# Patient Record
Sex: FEMALE | Race: BLACK OR AFRICAN AMERICAN | NOT HISPANIC OR LATINO | Employment: UNEMPLOYED | URBAN - METROPOLITAN AREA
[De-identification: names, ages, dates, MRNs, and addresses within clinical notes are randomized per-mention and may not be internally consistent; named-entity substitution may affect disease eponyms.]

---

## 2018-07-18 ENCOUNTER — OFFICE VISIT CONVERTED (OUTPATIENT)
Dept: ORTHOPEDIC SURGERY | Facility: CLINIC | Age: 58
End: 2018-07-18
Attending: ORTHOPAEDIC SURGERY

## 2018-12-05 ENCOUNTER — OFFICE VISIT CONVERTED (OUTPATIENT)
Dept: ORTHOPEDIC SURGERY | Facility: CLINIC | Age: 58
End: 2018-12-05
Attending: ORTHOPAEDIC SURGERY

## 2019-01-02 ENCOUNTER — OFFICE VISIT CONVERTED (OUTPATIENT)
Dept: ORTHOPEDIC SURGERY | Facility: CLINIC | Age: 59
End: 2019-01-02
Attending: ORTHOPAEDIC SURGERY

## 2019-02-07 ENCOUNTER — HOSPITAL ENCOUNTER (OUTPATIENT)
Dept: PREADMISSION TESTING | Facility: HOSPITAL | Age: 59
Discharge: HOME | End: 2019-02-07
Attending: ORTHOPAEDIC SURGERY

## 2019-02-07 LAB
ANION GAP SERPL CALC-SCNC: 16 MMOL/L (ref 8–19)
APTT BLD: 22.4 S (ref 22.2–34.2)
BASOPHILS # BLD AUTO: 0.05 10*3/UL (ref 0–0.2)
BASOPHILS NFR BLD AUTO: 1.35 % (ref 0–3)
BUN SERPL-MCNC: 28 MG/DL (ref 5–25)
BUN/CREAT SERPL: 14 {RATIO} (ref 6–20)
CALCIUM SERPL-MCNC: 8.5 MG/DL (ref 8.7–10.4)
CHLORIDE SERPL-SCNC: 106 MMOL/L (ref 99–111)
CONV CO2: 24 MMOL/L (ref 22–32)
CREAT UR-MCNC: 1.95 MG/DL (ref 0.5–0.9)
EOSINOPHIL # BLD AUTO: 0.05 10*3/UL (ref 0–0.7)
EOSINOPHIL # BLD AUTO: 1.35 % (ref 0–7)
ERYTHROCYTE [DISTWIDTH] IN BLOOD BY AUTOMATED COUNT: 13.1 % (ref 11.5–14.5)
GFR SERPLBLD BASED ON 1.73 SQ M-ARVRAT: 31 ML/MIN/{1.73_M2}
GLUCOSE SERPL-MCNC: 164 MG/DL (ref 65–99)
HBA1C MFR BLD: 11.5 G/DL (ref 12–16)
HCT VFR BLD AUTO: 33.5 % (ref 37–47)
INR PPP: 0.96 (ref 2–3)
LYMPHOCYTES # BLD AUTO: 1.38 10*3/UL (ref 1–5)
MCH RBC QN AUTO: 35.1 PG (ref 27–31)
MCHC RBC AUTO-ENTMCNC: 34.2 G/DL (ref 33–37)
MCV RBC AUTO: 102 FL (ref 81–99)
MONOCYTES # BLD AUTO: 0.49 10*3/UL (ref 0.2–1.2)
MONOCYTES NFR BLD AUTO: 13.4 % (ref 3–10)
NEUTROPHILS # BLD AUTO: 1.66 10*3/UL (ref 2–8)
NEUTROPHILS NFR BLD AUTO: 45.9 % (ref 30–85)
NRBC BLD AUTO-RTO: 0 % (ref 0–0.01)
OSMOLALITY SERPL CALC.SUM OF ELEC: 303 MOSM/KG (ref 273–304)
PLATELET # BLD AUTO: 104 10*3/UL (ref 130–400)
PMV BLD AUTO: 9.1 FL (ref 7.4–10.4)
POTASSIUM SERPL-SCNC: 4.1 MMOL/L (ref 3.5–5.3)
PROTHROMBIN TIME: 10 S (ref 9.4–12)
RBC # BLD AUTO: 3.27 10*6/UL (ref 4.2–5.4)
SODIUM SERPL-SCNC: 142 MMOL/L (ref 135–147)
VARIANT LYMPHS NFR BLD MANUAL: 38 % (ref 20–45)
WBC # BLD AUTO: 3.63 10*3/UL (ref 4.8–10.8)

## 2019-03-06 ENCOUNTER — OFFICE VISIT CONVERTED (OUTPATIENT)
Dept: ORTHOPEDIC SURGERY | Facility: CLINIC | Age: 59
End: 2019-03-06
Attending: PHYSICIAN ASSISTANT

## 2019-04-19 ENCOUNTER — OFFICE VISIT CONVERTED (OUTPATIENT)
Dept: ORTHOPEDIC SURGERY | Facility: CLINIC | Age: 59
End: 2019-04-19
Attending: ORTHOPAEDIC SURGERY

## 2019-04-19 ENCOUNTER — CONVERSION ENCOUNTER (OUTPATIENT)
Dept: ORTHOPEDIC SURGERY | Facility: CLINIC | Age: 59
End: 2019-04-19

## 2019-07-23 ENCOUNTER — HOSPITAL ENCOUNTER (OUTPATIENT)
Dept: OTHER | Facility: HOSPITAL | Age: 59
Discharge: HOME | End: 2019-07-23
Attending: PODIATRIST

## 2019-07-23 LAB — URATE SERPL-MCNC: 6.8 MG/DL (ref 2.5–7.5)

## 2019-09-30 ENCOUNTER — OFFICE VISIT CONVERTED (OUTPATIENT)
Dept: ORTHOPEDIC SURGERY | Facility: CLINIC | Age: 59
End: 2019-09-30
Attending: ORTHOPAEDIC SURGERY

## 2020-01-01 ENCOUNTER — OFFICE VISIT CONVERTED (OUTPATIENT)
Dept: ONCOLOGY | Facility: HOSPITAL | Age: 60
End: 2020-01-01
Attending: INTERNAL MEDICINE

## 2020-01-01 ENCOUNTER — HOSPITAL ENCOUNTER (OUTPATIENT)
Dept: OTHER | Facility: HOSPITAL | Age: 60
Discharge: HOME | End: 2020-11-13
Attending: INTERNAL MEDICINE

## 2020-01-01 ENCOUNTER — HOSPITAL ENCOUNTER (OUTPATIENT)
Dept: INFUSION THERAPY | Facility: HOSPITAL | Age: 60
Setting detail: RECURRING SERIES
Discharge: HOME | End: 2020-08-13
Attending: NURSE PRACTITIONER

## 2020-01-01 ENCOUNTER — OFFICE VISIT CONVERTED (OUTPATIENT)
Dept: PULMONOLOGY | Facility: CLINIC | Age: 60
End: 2020-01-01
Attending: INTERNAL MEDICINE

## 2020-01-01 ENCOUNTER — HOSPITAL ENCOUNTER (OUTPATIENT)
Dept: PET IMAGING | Facility: HOSPITAL | Age: 60
Discharge: HOME | End: 2020-10-09
Attending: INTERNAL MEDICINE

## 2020-01-01 ENCOUNTER — HOSPITAL ENCOUNTER (OUTPATIENT)
Dept: RADIATION ONCOLOGY | Facility: HOSPITAL | Age: 60
Discharge: HOME | End: 2020-10-06
Attending: NURSE PRACTITIONER

## 2020-01-01 LAB
ALBUMIN SERPL-MCNC: 3.5 G/DL (ref 3.5–5)
ALBUMIN SERPL-MCNC: 3.7 G/DL (ref 3.5–5)
ALBUMIN SERPL-MCNC: 3.8 G/DL (ref 3.5–5)
ALBUMIN/GLOB SERPL: 1.1 {RATIO} (ref 1.4–2.6)
ALBUMIN/GLOB SERPL: 1.1 {RATIO} (ref 1.4–2.6)
ALBUMIN/GLOB SERPL: 1.2 {RATIO} (ref 1.4–2.6)
ALBUMIN/GLOB SERPL: 1.3 {RATIO} (ref 1.4–2.6)
ALBUMIN/GLOB SERPL: 1.3 {RATIO} (ref 1.4–2.6)
ALP SERPL-CCNC: 118 U/L (ref 43–160)
ALP SERPL-CCNC: 140 U/L (ref 43–160)
ALP SERPL-CCNC: 140 U/L (ref 43–160)
ALP SERPL-CCNC: 144 U/L (ref 43–160)
ALP SERPL-CCNC: 152 U/L (ref 43–160)
ALT SERPL-CCNC: 21 U/L (ref 10–40)
ALT SERPL-CCNC: 23 U/L (ref 10–40)
ALT SERPL-CCNC: 23 U/L (ref 10–40)
ALT SERPL-CCNC: 30 U/L (ref 10–40)
ALT SERPL-CCNC: 70 U/L (ref 10–40)
ANION GAP SERPL CALC-SCNC: 13 MMOL/L (ref 8–19)
ANION GAP SERPL CALC-SCNC: 15 MMOL/L (ref 8–19)
ANION GAP SERPL CALC-SCNC: 15 MMOL/L (ref 8–19)
ANION GAP SERPL CALC-SCNC: 16 MMOL/L (ref 8–19)
ANION GAP SERPL CALC-SCNC: 17 MMOL/L (ref 8–19)
ANION GAP SERPL CALC-SCNC: 19 MMOL/L (ref 8–19)
AST SERPL-CCNC: 21 U/L (ref 15–50)
AST SERPL-CCNC: 27 U/L (ref 15–50)
AST SERPL-CCNC: 27 U/L (ref 15–50)
AST SERPL-CCNC: 29 U/L (ref 15–50)
AST SERPL-CCNC: 76 U/L (ref 15–50)
BASOPHILS # BLD AUTO: 0 10*3/UL (ref 0–0.2)
BASOPHILS # BLD AUTO: 0 10*3/UL (ref 0–0.2)
BASOPHILS # BLD AUTO: 0.01 10*3/UL (ref 0–0.2)
BASOPHILS # BLD AUTO: 0.01 10*3/UL (ref 0–0.2)
BASOPHILS # BLD AUTO: 0.02 10*3/UL (ref 0–0.2)
BASOPHILS NFR BLD AUTO: 0 % (ref 0–3)
BASOPHILS NFR BLD AUTO: 0 % (ref 0–3)
BASOPHILS NFR BLD AUTO: 0.5 % (ref 0–3)
BASOPHILS NFR BLD AUTO: 0.7 % (ref 0–3)
BASOPHILS NFR BLD AUTO: 0.8 % (ref 0–3)
BILIRUB SERPL-MCNC: 0.18 MG/DL (ref 0.2–1.3)
BILIRUB SERPL-MCNC: 0.23 MG/DL (ref 0.2–1.3)
BILIRUB SERPL-MCNC: 0.26 MG/DL (ref 0.2–1.3)
BILIRUB SERPL-MCNC: 0.28 MG/DL (ref 0.2–1.3)
BILIRUB SERPL-MCNC: 0.31 MG/DL (ref 0.2–1.3)
BUN SERPL-MCNC: 26 MG/DL (ref 5–25)
BUN SERPL-MCNC: 32 MG/DL (ref 5–25)
BUN SERPL-MCNC: 35 MG/DL (ref 5–25)
BUN SERPL-MCNC: 35 MG/DL (ref 5–25)
BUN SERPL-MCNC: 37 MG/DL (ref 5–25)
BUN SERPL-MCNC: 40 MG/DL (ref 5–25)
BUN/CREAT SERPL: 16 {RATIO} (ref 6–20)
BUN/CREAT SERPL: 17 {RATIO} (ref 6–20)
BUN/CREAT SERPL: 18 {RATIO} (ref 6–20)
BUN/CREAT SERPL: 18 {RATIO} (ref 6–20)
BUN/CREAT SERPL: 20 {RATIO} (ref 6–20)
BUN/CREAT SERPL: 22 {RATIO} (ref 6–20)
CALCIUM SERPL-MCNC: 8.9 MG/DL (ref 8.7–10.4)
CALCIUM SERPL-MCNC: 9.1 MG/DL (ref 8.7–10.4)
CALCIUM SERPL-MCNC: 9.2 MG/DL (ref 8.7–10.4)
CALCIUM SERPL-MCNC: 9.3 MG/DL (ref 8.7–10.4)
CALCIUM SERPL-MCNC: 9.3 MG/DL (ref 8.7–10.4)
CALCIUM SERPL-MCNC: 9.5 MG/DL (ref 8.7–10.4)
CHLORIDE SERPL-SCNC: 100 MMOL/L (ref 99–111)
CHLORIDE SERPL-SCNC: 101 MMOL/L (ref 99–111)
CHLORIDE SERPL-SCNC: 103 MMOL/L (ref 99–111)
CHLORIDE SERPL-SCNC: 104 MMOL/L (ref 99–111)
CHLORIDE SERPL-SCNC: 107 MMOL/L (ref 99–111)
CHLORIDE SERPL-SCNC: 109 MMOL/L (ref 99–111)
CONV ABS IMM GRAN: 0 10*3/UL (ref 0–0.2)
CONV ABS IMM GRAN: 0.01 10*3/UL (ref 0–0.54)
CONV ABS IMM GRAN: 0.02 10*3/UL (ref 0–0.54)
CONV ABS IMM GRAN: 0.02 10*3/UL (ref 0–0.54)
CONV ABS IMM GRAN: 0.04 10*3/UL (ref 0–0.54)
CONV CO2: 20 MMOL/L (ref 22–32)
CONV CO2: 22 MMOL/L (ref 22–32)
CONV CO2: 23 MMOL/L (ref 22–32)
CONV CO2: 24 MMOL/L (ref 22–32)
CONV EOSINOPHILS PERCENT BY MANUAL COUNT: 0.3 % (ref 0–7)
CONV EOSINOPHILS PERCENT BY MANUAL COUNT: 0.7 % (ref 0–7)
CONV EOSINOPHILS PERCENT BY MANUAL COUNT: 0.8 % (ref 0–7)
CONV EOSINOPHILS PERCENT BY MANUAL COUNT: 1 % (ref 0–7)
CONV IMMATURE GRAN: 0 % (ref 0–1.8)
CONV IMMATURE GRAN: 0.7 % (ref 0–0.4)
CONV IMMATURE GRAN: 0.8 % (ref 0–0.4)
CONV IMMATURE GRAN: 1 % (ref 0–0.4)
CONV IMMATURE GRAN: 1.2 % (ref 0–0.4)
CONV TOTAL PROTEIN: 6.3 G/DL (ref 6.3–8.2)
CONV TOTAL PROTEIN: 6.8 G/DL (ref 6.3–8.2)
CONV TOTAL PROTEIN: 6.9 G/DL (ref 6.3–8.2)
CONV TOTAL PROTEIN: 7 G/DL (ref 6.3–8.2)
CONV TOTAL PROTEIN: 7 G/DL (ref 6.3–8.2)
CREAT UR-MCNC: 1.55 MG/DL (ref 0.5–0.9)
CREAT UR-MCNC: 1.61 MG/DL (ref 0.5–0.9)
CREAT UR-MCNC: 1.71 MG/DL (ref 0.5–0.9)
CREAT UR-MCNC: 2.01 MG/DL (ref 0.5–0.9)
CREAT UR-MCNC: 2.04 MG/DL (ref 0.5–0.9)
CREAT UR-MCNC: 2.24 MG/DL (ref 0.5–0.9)
DEPRECATED RDW RBC AUTO: 60.6 FL (ref 36.4–46.3)
EOSINOPHIL # BLD AUTO: 0.01 10*3/UL (ref 0–0.7)
EOSINOPHIL # BLD AUTO: 1.1 % (ref 0–7)
EOSINOPHIL # BLD MANUAL: 0.01 10*3/UL (ref 0–0.7)
EOSINOPHIL # BLD MANUAL: 0.01 10*3/UL (ref 0–0.7)
EOSINOPHIL # BLD MANUAL: 0.02 10*3/UL (ref 0–0.7)
EOSINOPHIL # BLD MANUAL: 0.02 10*3/UL (ref 0–0.7)
ERYTHROCYTE [DISTWIDTH] IN BLOOD BY AUTOMATED COUNT: 13.7 % (ref 11.5–14.5)
ERYTHROCYTE [DISTWIDTH] IN BLOOD BY AUTOMATED COUNT: 15.9 % (ref 11.5–14.5)
ERYTHROCYTE [DISTWIDTH] IN BLOOD BY AUTOMATED COUNT: 16.4 % (ref 11.5–14.5)
ERYTHROCYTE [DISTWIDTH] IN BLOOD BY AUTOMATED COUNT: 16.4 % (ref 11.7–14.4)
ERYTHROCYTE [DISTWIDTH] IN BLOOD BY AUTOMATED COUNT: 18.5 % (ref 11.5–14.5)
ERYTHROCYTE [DISTWIDTH] IN BLOOD BY AUTOMATED COUNT: 51.8 FL
ERYTHROCYTE [DISTWIDTH] IN BLOOD BY AUTOMATED COUNT: 60.4 FL
ERYTHROCYTE [DISTWIDTH] IN BLOOD BY AUTOMATED COUNT: 63.5 FL
ERYTHROCYTE [DISTWIDTH] IN BLOOD BY AUTOMATED COUNT: 75.5 FL
GFR SERPLBLD BASED ON 1.73 SQ M-ARVRAT: 26 ML/MIN/{1.73_M2}
GFR SERPLBLD BASED ON 1.73 SQ M-ARVRAT: 29 ML/MIN/{1.73_M2}
GFR SERPLBLD BASED ON 1.73 SQ M-ARVRAT: 30 ML/MIN/{1.73_M2}
GFR SERPLBLD BASED ON 1.73 SQ M-ARVRAT: 36 ML/MIN/{1.73_M2}
GFR SERPLBLD BASED ON 1.73 SQ M-ARVRAT: 39 ML/MIN/{1.73_M2}
GFR SERPLBLD BASED ON 1.73 SQ M-ARVRAT: 41 ML/MIN/{1.73_M2}
GLOBULIN UR ELPH-MCNC: 2.8 G/DL (ref 2–3.5)
GLOBULIN UR ELPH-MCNC: 3 G/DL (ref 2–3.5)
GLOBULIN UR ELPH-MCNC: 3.2 G/DL (ref 2–3.5)
GLOBULIN UR ELPH-MCNC: 3.3 G/DL (ref 2–3.5)
GLOBULIN UR ELPH-MCNC: 3.3 G/DL (ref 2–3.5)
GLUCOSE SERPL-MCNC: 172 MG/DL (ref 65–99)
GLUCOSE SERPL-MCNC: 175 MG/DL (ref 65–99)
GLUCOSE SERPL-MCNC: 207 MG/DL (ref 65–99)
GLUCOSE SERPL-MCNC: 238 MG/DL (ref 65–99)
GLUCOSE SERPL-MCNC: 249 MG/DL (ref 65–99)
GLUCOSE SERPL-MCNC: 89 MG/DL (ref 65–99)
HBA1C MFR BLD: 10.5 G/DL (ref 12–16)
HBA1C MFR BLD: 11.2 G/DL (ref 12–16)
HBA1C MFR BLD: 9.5 G/DL (ref 12–16)
HBA1C MFR BLD: 9.8 G/DL (ref 12–16)
HCT VFR BLD AUTO: 29.1 % (ref 37–47)
HCT VFR BLD AUTO: 30 % (ref 37–47)
HCT VFR BLD AUTO: 30.7 % (ref 37–47)
HCT VFR BLD AUTO: 32.8 % (ref 37–47)
HCT VFR BLD AUTO: 34.7 % (ref 37–47)
HGB BLD-MCNC: 9.4 G/DL (ref 12–16)
IRON SATN MFR SERPL: 19 % (ref 20–55)
IRON SERPL-MCNC: 74 UG/DL (ref 60–170)
LYMPHOCYTES # BLD AUTO: 0.19 10*3/UL (ref 1–5)
LYMPHOCYTES # BLD AUTO: 0.25 10*3/UL (ref 1–5)
LYMPHOCYTES # BLD AUTO: 0.41 10*3/UL (ref 1–5)
LYMPHOCYTES # BLD AUTO: 0.81 10*3/UL (ref 1–5)
LYMPHOCYTES # BLD AUTO: 0.82 10*3/UL (ref 1–5)
LYMPHOCYTES NFR BLD AUTO: 16.9 % (ref 20–45)
LYMPHOCYTES NFR BLD AUTO: 19.7 % (ref 20–45)
LYMPHOCYTES NFR BLD AUTO: 20.9 % (ref 20–45)
LYMPHOCYTES NFR BLD AUTO: 25 % (ref 20–45)
LYMPHOCYTES NFR BLD AUTO: 31.2 % (ref 20–45)
MAGNESIUM SERPL-MCNC: 1.4 MG/DL (ref 1.6–2.3)
MAGNESIUM SERPL-MCNC: 1.52 MG/DL (ref 1.6–2.3)
MAGNESIUM SERPL-MCNC: 1.65 MG/DL (ref 1.6–2.3)
MAGNESIUM SERPL-MCNC: 1.83 MG/DL (ref 1.6–2.3)
MCH RBC QN AUTO: 33.1 PG (ref 27–31)
MCH RBC QN AUTO: 33.8 PG (ref 27–31)
MCH RBC QN AUTO: 33.9 PG (ref 27–31)
MCH RBC QN AUTO: 34.4 PG (ref 27–31)
MCH RBC QN AUTO: 35.4 PG (ref 27–31)
MCHC RBC AUTO-ENTMCNC: 31.7 G/DL (ref 33–37)
MCHC RBC AUTO-ENTMCNC: 31.9 G/DL (ref 33–37)
MCHC RBC AUTO-ENTMCNC: 32 G/DL (ref 33–37)
MCHC RBC AUTO-ENTMCNC: 32.3 G/DL (ref 33–37)
MCHC RBC AUTO-ENTMCNC: 32.3 G/DL (ref 33–37)
MCV RBC AUTO: 102.7 FL (ref 81–99)
MCV RBC AUTO: 105.8 FL (ref 81–99)
MCV RBC AUTO: 105.9 FL (ref 81–99)
MCV RBC AUTO: 106.6 FL (ref 81–99)
MCV RBC AUTO: 111.9 FL (ref 81–99)
MONOCYTES # BLD AUTO: 0.12 10*3/UL (ref 0.2–1.2)
MONOCYTES # BLD AUTO: 0.25 10*3/UL (ref 0.2–1.2)
MONOCYTES # BLD AUTO: 0.55 10*3/UL (ref 0.2–1.2)
MONOCYTES # BLD AUTO: 0.59 10*3/UL (ref 0.2–1.2)
MONOCYTES # BLD AUTO: 0.6 10*3/UL (ref 0.2–1.2)
MONOCYTES NFR BLD AUTO: 27.5 % (ref 3–10)
MONOCYTES NFR BLD MANUAL: 18.3 % (ref 3–10)
MONOCYTES NFR BLD MANUAL: 22.7 % (ref 3–10)
MONOCYTES NFR BLD MANUAL: 26.4 % (ref 3–10)
MONOCYTES NFR BLD MANUAL: 8.1 % (ref 3–10)
NEUTROPHILS # BLD AUTO: 0.46 10*3/UL (ref 2–8)
NEUTROPHILS # BLD AUTO: 1.07 10*3/UL (ref 2–8)
NEUTROPHILS # BLD AUTO: 1.08 10*3/UL (ref 2–8)
NEUTROPHILS # BLD AUTO: 1.14 10*3/UL (ref 2–8)
NEUTROPHILS # BLD AUTO: 1.81 10*3/UL (ref 2–8)
NEUTROPHILS NFR BLD AUTO: 50.5 % (ref 30–85)
NEUTROPHILS NFR BLD MANUAL: 43.7 % (ref 30–85)
NEUTROPHILS NFR BLD MANUAL: 51.4 % (ref 30–85)
NEUTROPHILS NFR BLD MANUAL: 55.2 % (ref 30–85)
NEUTROPHILS NFR BLD MANUAL: 72.9 % (ref 30–85)
NRBC CBCN: 2.2 % (ref 0–0.7)
OSMOLALITY SERPL CALC.SUM OF ELEC: 291 MOSM/KG (ref 273–304)
OSMOLALITY SERPL CALC.SUM OF ELEC: 296 MOSM/KG (ref 273–304)
OSMOLALITY SERPL CALC.SUM OF ELEC: 296 MOSM/KG (ref 273–304)
OSMOLALITY SERPL CALC.SUM OF ELEC: 297 MOSM/KG (ref 273–304)
OSMOLALITY SERPL CALC.SUM OF ELEC: 304 MOSM/KG (ref 273–304)
OSMOLALITY SERPL CALC.SUM OF ELEC: 304 MOSM/KG (ref 273–304)
PATHOLOGY REVIEW: NORMAL
PATHOLOGY REVIEW: NORMAL
PLATELET # BLD AUTO: 111 10*3/UL (ref 130–400)
PLATELET # BLD AUTO: 116 10*3/UL (ref 130–400)
PLATELET # BLD AUTO: 161 10*3/UL (ref 130–400)
PLATELET # BLD AUTO: 74 10*3/UL (ref 130–400)
PLATELET # BLD AUTO: 77 10*3/UL (ref 130–400)
PMV BLD AUTO: 10.2 FL (ref 7.4–10.4)
PMV BLD AUTO: 11 FL (ref 7.4–10.4)
PMV BLD AUTO: 11.2 FL (ref 7.4–10.4)
PMV BLD AUTO: 12 FL (ref 7.4–10.4)
PMV BLD AUTO: 12.6 FL (ref 9.4–12.3)
POTASSIUM SERPL-SCNC: 4.6 MMOL/L (ref 3.5–5.3)
POTASSIUM SERPL-SCNC: 4.7 MMOL/L (ref 3.5–5.3)
POTASSIUM SERPL-SCNC: 5 MMOL/L (ref 3.5–5.3)
POTASSIUM SERPL-SCNC: 5 MMOL/L (ref 3.5–5.3)
RBC # BLD AUTO: 2.73 10*6/UL (ref 4.2–5.4)
RBC MORPH BLD: 2.68 10*6/UL (ref 4.2–5.4)
RBC MORPH BLD: 2.9 10*6/UL (ref 4.2–5.4)
RBC MORPH BLD: 3.1 10*6/UL (ref 4.2–5.4)
RBC MORPH BLD: 3.38 10*6/UL (ref 4.2–5.4)
SODIUM SERPL-SCNC: 135 MMOL/L (ref 135–147)
SODIUM SERPL-SCNC: 137 MMOL/L (ref 135–147)
SODIUM SERPL-SCNC: 137 MMOL/L (ref 135–147)
SODIUM SERPL-SCNC: 138 MMOL/L (ref 135–147)
SODIUM SERPL-SCNC: 139 MMOL/L (ref 135–147)
SODIUM SERPL-SCNC: 140 MMOL/L (ref 135–147)
TIBC SERPL-MCNC: 388 UG/DL (ref 245–450)
TRANSFERRIN SERPL-MCNC: 271 MG/DL (ref 250–380)
WBC # BLD AUTO: 0.91 10*3/UL (ref 4.8–10.8)
WBC # BLD AUTO: 1.48 10*3/UL (ref 4.8–10.8)
WBC # BLD AUTO: 2.08 10*3/UL (ref 4.8–10.8)
WBC # BLD AUTO: 2.6 10*3/UL (ref 4.8–10.8)
WBC # BLD AUTO: 3.28 10*3/UL (ref 4.8–10.8)

## 2020-04-08 ENCOUNTER — OFFICE VISIT CONVERTED (OUTPATIENT)
Dept: ORTHOPEDIC SURGERY | Facility: CLINIC | Age: 60
End: 2020-04-08
Attending: ORTHOPAEDIC SURGERY

## 2020-04-28 ENCOUNTER — OFFICE VISIT CONVERTED (OUTPATIENT)
Dept: ONCOLOGY | Facility: HOSPITAL | Age: 60
End: 2020-04-28
Attending: INTERNAL MEDICINE

## 2020-04-28 ENCOUNTER — HOSPITAL ENCOUNTER (OUTPATIENT)
Dept: ONCOLOGY | Facility: HOSPITAL | Age: 60
Discharge: HOME | End: 2020-04-28
Attending: INTERNAL MEDICINE

## 2020-05-04 ENCOUNTER — HOSPITAL ENCOUNTER (OUTPATIENT)
Dept: PET IMAGING | Facility: HOSPITAL | Age: 60
Discharge: HOME | End: 2020-05-04
Attending: INTERNAL MEDICINE

## 2020-05-05 ENCOUNTER — OFFICE VISIT CONVERTED (OUTPATIENT)
Dept: ONCOLOGY | Facility: HOSPITAL | Age: 60
End: 2020-05-05
Attending: PHYSICIAN ASSISTANT

## 2020-05-12 ENCOUNTER — HOSPITAL ENCOUNTER (OUTPATIENT)
Dept: ONCOLOGY | Facility: HOSPITAL | Age: 60
Discharge: HOME | End: 2020-05-12
Attending: INTERNAL MEDICINE

## 2020-05-12 ENCOUNTER — OFFICE VISIT CONVERTED (OUTPATIENT)
Dept: ONCOLOGY | Facility: HOSPITAL | Age: 60
End: 2020-05-12
Attending: INTERNAL MEDICINE

## 2020-05-15 ENCOUNTER — OFFICE VISIT CONVERTED (OUTPATIENT)
Dept: SURGERY | Facility: CLINIC | Age: 60
End: 2020-05-15
Attending: SURGERY

## 2020-05-28 ENCOUNTER — HOSPITAL ENCOUNTER (OUTPATIENT)
Dept: RADIATION ONCOLOGY | Facility: HOSPITAL | Age: 60
Setting detail: RECURRING SERIES
Discharge: STILL ADMITTED OR ADMINISTRATIVE | End: 2020-08-25
Attending: RADIOLOGY

## 2020-06-04 ENCOUNTER — HOSPITAL ENCOUNTER (OUTPATIENT)
Dept: PERIOP | Facility: HOSPITAL | Age: 60
Setting detail: HOSPITAL OUTPATIENT SURGERY
Discharge: HOME | End: 2020-06-04
Attending: SURGERY

## 2020-06-04 LAB
GLUCOSE BLD-MCNC: 128 MG/DL (ref 65–99)
SARS-COV-2 RNA SPEC QL NAA+PROBE: NOT DETECTED

## 2020-06-08 ENCOUNTER — HOSPITAL ENCOUNTER (OUTPATIENT)
Dept: OTHER | Facility: HOSPITAL | Age: 60
Setting detail: RECURRING SERIES
Discharge: STILL ADMITTED OR ADMINISTRATIVE | End: 2020-08-31
Attending: INTERNAL MEDICINE

## 2020-06-08 ENCOUNTER — OFFICE VISIT CONVERTED (OUTPATIENT)
Dept: ONCOLOGY | Facility: HOSPITAL | Age: 60
End: 2020-06-08
Attending: INTERNAL MEDICINE

## 2020-06-08 LAB
ALBUMIN SERPL-MCNC: 3.7 G/DL (ref 3.5–5)
ALBUMIN/GLOB SERPL: 1.1 {RATIO} (ref 1.4–2.6)
ALP SERPL-CCNC: 115 U/L (ref 43–160)
ALT SERPL-CCNC: 54 U/L (ref 10–40)
ANION GAP SERPL CALC-SCNC: 14 MMOL/L (ref 8–19)
AST SERPL-CCNC: 44 U/L (ref 15–50)
BASOPHILS # BLD AUTO: 0.03 10*3/UL (ref 0–0.2)
BASOPHILS NFR BLD AUTO: 0.8 % (ref 0–3)
BILIRUB SERPL-MCNC: 0.2 MG/DL (ref 0.2–1.3)
BUN SERPL-MCNC: 19 MG/DL (ref 5–25)
BUN/CREAT SERPL: 16 {RATIO} (ref 6–20)
CALCIUM SERPL-MCNC: 9.3 MG/DL (ref 8.7–10.4)
CALCIUM SPEC-SCNC: 9.1 MG/DL (ref 8.7–10.4)
CHLORIDE SERPL-SCNC: 108 MMOL/L (ref 99–111)
CONV ABS IMM GRAN: 0.01 10*3/UL (ref 0–0.54)
CONV CO2: 23 MMOL/L (ref 22–32)
CONV EOSINOPHILS PERCENT BY MANUAL COUNT: 3.1 % (ref 0–7)
CONV IMMATURE GRAN: 0.3 % (ref 0–0.4)
CONV TOTAL PROTEIN: 7.1 G/DL (ref 6.3–8.2)
CREAT UR-MCNC: 1.19 MG/DL (ref 0.5–0.9)
EOSINOPHIL # BLD MANUAL: 0.11 10*3/UL (ref 0–0.7)
ERYTHROCYTE [DISTWIDTH] IN BLOOD BY AUTOMATED COUNT: 12.5 % (ref 11.5–14.5)
ERYTHROCYTE [DISTWIDTH] IN BLOOD BY AUTOMATED COUNT: 49.7 FL
GFR SERPLBLD BASED ON 1.73 SQ M-ARVRAT: 57 ML/MIN/{1.73_M2}
GLOBULIN UR ELPH-MCNC: 3.4 G/DL (ref 2–3.5)
GLUCOSE SERPL-MCNC: 153 MG/DL (ref 65–99)
HBA1C MFR BLD: 11.6 G/DL (ref 12–16)
HCT VFR BLD AUTO: 37.3 % (ref 37–47)
LYMPHOCYTES # BLD AUTO: 1.27 10*3/UL (ref 1–5)
LYMPHOCYTES NFR BLD AUTO: 36 % (ref 20–45)
MAGNESIUM SERPL-MCNC: 1.6 MG/DL (ref 1.6–2.3)
MCH RBC QN AUTO: 33.1 PG (ref 27–31)
MCHC RBC AUTO-ENTMCNC: 31.1 G/DL (ref 33–37)
MCV RBC AUTO: 106.6 FL (ref 81–99)
MONOCYTES # BLD AUTO: 0.54 10*3/UL (ref 0.2–1.2)
MONOCYTES NFR BLD MANUAL: 15.3 % (ref 3–10)
NEUTROPHILS # BLD AUTO: 1.57 10*3/UL (ref 2–8)
NEUTROPHILS NFR BLD MANUAL: 44.5 % (ref 30–85)
OSMOLALITY SERPL CALC.SUM OF ELEC: 297 MOSM/KG (ref 273–304)
PLATELET # BLD AUTO: 147 10*3/UL (ref 130–400)
PMV BLD AUTO: 10.5 FL (ref 7.4–10.4)
POTASSIUM SERPL-SCNC: 4.2 MMOL/L (ref 3.5–5.3)
RBC MORPH BLD: 3.5 10*6/UL (ref 4.2–5.4)
SODIUM SERPL-SCNC: 141 MMOL/L (ref 135–147)
WBC # BLD AUTO: 3.53 10*3/UL (ref 4.8–10.8)

## 2020-06-22 ENCOUNTER — CONVERSION ENCOUNTER (OUTPATIENT)
Dept: SURGERY | Facility: CLINIC | Age: 60
End: 2020-06-22

## 2020-06-22 ENCOUNTER — OFFICE VISIT CONVERTED (OUTPATIENT)
Dept: SURGERY | Facility: CLINIC | Age: 60
End: 2020-06-22
Attending: SURGERY

## 2021-01-01 ENCOUNTER — READMISSION MANAGEMENT (OUTPATIENT)
Dept: CALL CENTER | Facility: HOSPITAL | Age: 61
End: 2021-01-01

## 2021-01-01 ENCOUNTER — HOSPITAL ENCOUNTER (OUTPATIENT)
Dept: GENERAL RADIOLOGY | Facility: HOSPITAL | Age: 61
Discharge: HOME | End: 2021-06-30
Admitting: FAMILY MEDICINE

## 2021-01-01 ENCOUNTER — HOSPITAL ENCOUNTER (OUTPATIENT)
Dept: GENERAL RADIOLOGY | Facility: HOSPITAL | Age: 61
Discharge: HOME | End: 2021-04-21
Attending: FAMILY MEDICINE

## 2021-01-01 ENCOUNTER — OFFICE VISIT CONVERTED (OUTPATIENT)
Dept: PULMONOLOGY | Facility: CLINIC | Age: 61
End: 2021-01-01
Attending: INTERNAL MEDICINE

## 2021-01-01 ENCOUNTER — HOSPITAL ENCOUNTER (OUTPATIENT)
Dept: CT IMAGING | Facility: HOSPITAL | Age: 61
Discharge: HOME | End: 2021-01-07
Attending: INTERNAL MEDICINE

## 2021-01-01 ENCOUNTER — OFFICE VISIT CONVERTED (OUTPATIENT)
Dept: PULMONOLOGY | Facility: CLINIC | Age: 61
End: 2021-01-01
Attending: SPECIALIST

## 2021-01-01 ENCOUNTER — TELEPHONE (OUTPATIENT)
Dept: ONCOLOGY | Facility: HOSPITAL | Age: 61
End: 2021-01-01

## 2021-01-01 ENCOUNTER — APPOINTMENT (OUTPATIENT)
Dept: GENERAL RADIOLOGY | Facility: HOSPITAL | Age: 61
End: 2021-01-01

## 2021-01-01 ENCOUNTER — TRANSCRIBE ORDERS (OUTPATIENT)
Dept: CARDIOLOGY | Facility: HOSPITAL | Age: 61
End: 2021-01-01

## 2021-01-01 ENCOUNTER — ANESTHESIA (OUTPATIENT)
Dept: GASTROENTEROLOGY | Facility: HOSPITAL | Age: 61
End: 2021-01-01

## 2021-01-01 ENCOUNTER — HOSPITAL ENCOUNTER (EMERGENCY)
Facility: HOSPITAL | Age: 61
End: 2021-01-01

## 2021-01-01 ENCOUNTER — HOSPITAL ENCOUNTER (INPATIENT)
Facility: HOSPITAL | Age: 61
LOS: 7 days | Discharge: HOME | End: 2021-05-04
Attending: THORACIC SURGERY (CARDIOTHORACIC VASCULAR SURGERY) | Admitting: THORACIC SURGERY (CARDIOTHORACIC VASCULAR SURGERY)

## 2021-01-01 ENCOUNTER — APPOINTMENT (OUTPATIENT)
Dept: CARDIOLOGY | Facility: HOSPITAL | Age: 61
End: 2021-01-01

## 2021-01-01 ENCOUNTER — HOSPITAL ENCOUNTER (EMERGENCY)
Facility: HOSPITAL | Age: 61
End: 2021-07-06
Attending: EMERGENCY MEDICINE | Admitting: EMERGENCY MEDICINE

## 2021-01-01 ENCOUNTER — APPOINTMENT (OUTPATIENT)
Dept: ULTRASOUND IMAGING | Facility: HOSPITAL | Age: 61
End: 2021-01-01

## 2021-01-01 ENCOUNTER — ANESTHESIA EVENT (OUTPATIENT)
Dept: GASTROENTEROLOGY | Facility: HOSPITAL | Age: 61
End: 2021-01-01

## 2021-01-01 ENCOUNTER — HOSPITAL ENCOUNTER (OUTPATIENT)
Dept: OTHER | Facility: HOSPITAL | Age: 61
Discharge: HOME | End: 2021-01-12
Attending: INTERNAL MEDICINE

## 2021-01-01 ENCOUNTER — TRANSCRIBE ORDERS (OUTPATIENT)
Dept: GENERAL RADIOLOGY | Facility: HOSPITAL | Age: 61
End: 2021-01-01

## 2021-01-01 ENCOUNTER — HOSPITAL ENCOUNTER (OUTPATIENT)
Dept: PREADMISSION TESTING | Facility: HOSPITAL | Age: 61
Discharge: HOME | End: 2021-03-04
Attending: INTERNAL MEDICINE

## 2021-01-01 ENCOUNTER — HOSPITAL ENCOUNTER (OUTPATIENT)
Dept: GASTROENTEROLOGY | Facility: HOSPITAL | Age: 61
Setting detail: HOSPITAL OUTPATIENT SURGERY
Discharge: HOME | End: 2021-03-08
Attending: INTERNAL MEDICINE

## 2021-01-01 ENCOUNTER — CONSULT (OUTPATIENT)
Dept: CARDIOLOGY | Facility: CLINIC | Age: 61
End: 2021-01-01

## 2021-01-01 ENCOUNTER — HOSPITAL ENCOUNTER (OUTPATIENT)
Dept: PET IMAGING | Facility: HOSPITAL | Age: 61
Discharge: HOME | End: 2021-05-12
Attending: INTERNAL MEDICINE

## 2021-01-01 ENCOUNTER — HOSPITAL ENCOUNTER (OUTPATIENT)
Dept: OTHER | Facility: HOSPITAL | Age: 61
Discharge: HOME | End: 2021-03-15
Attending: INTERNAL MEDICINE

## 2021-01-01 ENCOUNTER — HOSPITAL ENCOUNTER (OUTPATIENT)
Dept: MRI IMAGING | Facility: HOSPITAL | Age: 61
Discharge: HOME | End: 2021-01-28
Attending: INTERNAL MEDICINE

## 2021-01-01 ENCOUNTER — HOSPITAL ENCOUNTER (OUTPATIENT)
Dept: OTHER | Facility: HOSPITAL | Age: 61
Discharge: HOME | End: 2021-05-18
Attending: INTERNAL MEDICINE

## 2021-01-01 ENCOUNTER — TRANSCRIBE ORDERS (OUTPATIENT)
Dept: PULMONOLOGY | Facility: CLINIC | Age: 61
End: 2021-01-01

## 2021-01-01 ENCOUNTER — HOSPITAL ENCOUNTER (OUTPATIENT)
Dept: PREADMISSION TESTING | Facility: HOSPITAL | Age: 61
Discharge: HOME | End: 2021-02-10
Attending: INTERNAL MEDICINE

## 2021-01-01 VITALS
WEIGHT: 184.3 LBS | SYSTOLIC BLOOD PRESSURE: 138 MMHG | OXYGEN SATURATION: 97 % | SYSTOLIC BLOOD PRESSURE: 148 MMHG | BODY MASS INDEX: 28.56 KG/M2 | TEMPERATURE: 97.2 F | OXYGEN SATURATION: 86 % | HEART RATE: 53 BPM | RESPIRATION RATE: 18 BRPM | SYSTOLIC BLOOD PRESSURE: 136 MMHG | WEIGHT: 185.41 LBS | BODY MASS INDEX: 29.04 KG/M2 | BODY MASS INDEX: 28.02 KG/M2 | DIASTOLIC BLOOD PRESSURE: 81 MMHG | BODY MASS INDEX: 27.62 KG/M2 | RESPIRATION RATE: 18 BRPM | HEART RATE: 72 BPM | DIASTOLIC BLOOD PRESSURE: 75 MMHG | OXYGEN SATURATION: 99 % | WEIGHT: 187.83 LBS | TEMPERATURE: 97 F | WEIGHT: 181.66 LBS | RESPIRATION RATE: 18 BRPM | HEART RATE: 58 BPM | RESPIRATION RATE: 20 BRPM | TEMPERATURE: 97.2 F | DIASTOLIC BLOOD PRESSURE: 86 MMHG | HEART RATE: 90 BPM | DIASTOLIC BLOOD PRESSURE: 69 MMHG | SYSTOLIC BLOOD PRESSURE: 144 MMHG | OXYGEN SATURATION: 97 % | TEMPERATURE: 97.5 F

## 2021-01-01 VITALS
OXYGEN SATURATION: 99 % | BODY MASS INDEX: 29.87 KG/M2 | RESPIRATION RATE: 14 BRPM | HEIGHT: 67 IN | HEART RATE: 94 BPM | BODY MASS INDEX: 34.53 KG/M2 | HEIGHT: 68 IN | WEIGHT: 220 LBS | WEIGHT: 197.12 LBS

## 2021-01-01 VITALS
OXYGEN SATURATION: 97 % | WEIGHT: 192.9 LBS | HEART RATE: 68 BPM | HEIGHT: 66 IN | RESPIRATION RATE: 18 BRPM | BODY MASS INDEX: 29.46 KG/M2 | DIASTOLIC BLOOD PRESSURE: 76 MMHG | WEIGHT: 193.78 LBS | BODY MASS INDEX: 31.43 KG/M2 | RESPIRATION RATE: 20 BRPM | HEIGHT: 66 IN | DIASTOLIC BLOOD PRESSURE: 78 MMHG | HEART RATE: 76 BPM | OXYGEN SATURATION: 97 % | OXYGEN SATURATION: 100 % | HEART RATE: 83 BPM | SYSTOLIC BLOOD PRESSURE: 100 MMHG | OXYGEN SATURATION: 99 % | BODY MASS INDEX: 30.3 KG/M2 | TEMPERATURE: 97.3 F | RESPIRATION RATE: 18 BRPM | WEIGHT: 195.55 LBS | RESPIRATION RATE: 18 BRPM | DIASTOLIC BLOOD PRESSURE: 80 MMHG | SYSTOLIC BLOOD PRESSURE: 138 MMHG | SYSTOLIC BLOOD PRESSURE: 154 MMHG | HEIGHT: 66 IN | HEART RATE: 83 BPM | TEMPERATURE: 96.1 F | BODY MASS INDEX: 31 KG/M2 | DIASTOLIC BLOOD PRESSURE: 89 MMHG | BODY MASS INDEX: 32.1 KG/M2 | HEART RATE: 82 BPM | HEART RATE: 81 BPM | OXYGEN SATURATION: 100 % | SYSTOLIC BLOOD PRESSURE: 140 MMHG | DIASTOLIC BLOOD PRESSURE: 93 MMHG | WEIGHT: 199.74 LBS | SYSTOLIC BLOOD PRESSURE: 137 MMHG | SYSTOLIC BLOOD PRESSURE: 141 MMHG | OXYGEN SATURATION: 93 % | TEMPERATURE: 97.4 F | WEIGHT: 199.3 LBS | RESPIRATION RATE: 20 BRPM | DIASTOLIC BLOOD PRESSURE: 81 MMHG | TEMPERATURE: 98 F | TEMPERATURE: 97.3 F | TEMPERATURE: 98 F | BODY MASS INDEX: 30.44 KG/M2 | WEIGHT: 200.18 LBS

## 2021-01-01 VITALS
SYSTOLIC BLOOD PRESSURE: 137 MMHG | HEIGHT: 67 IN | HEART RATE: 67 BPM | OXYGEN SATURATION: 94 % | BODY MASS INDEX: 30.13 KG/M2 | TEMPERATURE: 97.7 F | WEIGHT: 192 LBS | RESPIRATION RATE: 20 BRPM | DIASTOLIC BLOOD PRESSURE: 66 MMHG

## 2021-01-01 VITALS
HEART RATE: 52 BPM | WEIGHT: 182 LBS | SYSTOLIC BLOOD PRESSURE: 118 MMHG | DIASTOLIC BLOOD PRESSURE: 74 MMHG | HEIGHT: 67 IN | BODY MASS INDEX: 28.56 KG/M2

## 2021-01-01 VITALS — HEART RATE: 71 BPM | BODY MASS INDEX: 28.66 KG/M2 | HEIGHT: 68 IN | WEIGHT: 189.12 LBS | OXYGEN SATURATION: 97 %

## 2021-01-01 VITALS
DIASTOLIC BLOOD PRESSURE: 106 MMHG | WEIGHT: 218.92 LBS | SYSTOLIC BLOOD PRESSURE: 129 MMHG | HEART RATE: 142 BPM | OXYGEN SATURATION: 87 %

## 2021-01-01 VITALS
SYSTOLIC BLOOD PRESSURE: 122 MMHG | TEMPERATURE: 97 F | WEIGHT: 181.12 LBS | RESPIRATION RATE: 16 BRPM | OXYGEN SATURATION: 94 % | BODY MASS INDEX: 28.43 KG/M2 | DIASTOLIC BLOOD PRESSURE: 63 MMHG | HEART RATE: 56 BPM | HEIGHT: 67 IN

## 2021-01-01 VITALS — OXYGEN SATURATION: 98 % | HEIGHT: 67 IN | WEIGHT: 225 LBS | HEART RATE: 75 BPM | BODY MASS INDEX: 35.31 KG/M2

## 2021-01-01 VITALS — BODY MASS INDEX: 29.86 KG/M2 | RESPIRATION RATE: 14 BRPM | WEIGHT: 197 LBS | HEIGHT: 68 IN

## 2021-01-01 VITALS — OXYGEN SATURATION: 94 % | HEART RATE: 91 BPM | HEIGHT: 65 IN

## 2021-01-01 VITALS — WEIGHT: 225 LBS | BODY MASS INDEX: 35.31 KG/M2 | HEIGHT: 67 IN

## 2021-01-01 VITALS — WEIGHT: 194 LBS | BODY MASS INDEX: 32.32 KG/M2 | HEART RATE: 95 BPM | HEIGHT: 65 IN | OXYGEN SATURATION: 99 %

## 2021-01-01 VITALS — BODY MASS INDEX: 28.64 KG/M2 | WEIGHT: 189 LBS | HEIGHT: 68 IN

## 2021-01-01 DIAGNOSIS — I46.9 CARDIAC ARREST (HCC): Primary | ICD-10-CM

## 2021-01-01 DIAGNOSIS — J98.59 MEDIASTINAL MASS: Primary | ICD-10-CM

## 2021-01-01 DIAGNOSIS — J40 BRONCHITIS: Primary | ICD-10-CM

## 2021-01-01 DIAGNOSIS — I48.0 PAROXYSMAL ATRIAL FIBRILLATION (HCC): ICD-10-CM

## 2021-01-01 DIAGNOSIS — J84.9 INTERSTITIAL PULMONARY DISEASE (HCC): Primary | ICD-10-CM

## 2021-01-01 DIAGNOSIS — I48.91 ATRIAL FIBRILLATION, UNSPECIFIED TYPE (HCC): Primary | ICD-10-CM

## 2021-01-01 DIAGNOSIS — R07.9 CHEST PAIN, UNSPECIFIED TYPE: Primary | ICD-10-CM

## 2021-01-01 DIAGNOSIS — J40 BRONCHITIS: ICD-10-CM

## 2021-01-01 LAB
ALBUMIN SERPL-MCNC: 2.4 G/DL (ref 3.5–5.2)
ALBUMIN SERPL-MCNC: 2.6 G/DL (ref 3.5–5.2)
ALBUMIN SERPL-MCNC: 2.7 G/DL (ref 3.5–5.2)
ALBUMIN SERPL-MCNC: 2.8 G/DL (ref 3.5–5.2)
ALBUMIN SERPL-MCNC: 3.3 G/DL (ref 3.5–5)
ALBUMIN SERPL-MCNC: 3.5 G/DL (ref 3.5–5)
ALBUMIN SERPL-MCNC: 3.8 G/DL (ref 3.5–5)
ALBUMIN/GLOB SERPL: 0.7 G/DL
ALBUMIN/GLOB SERPL: 0.8 G/DL
ALBUMIN/GLOB SERPL: 0.8 G/DL
ALBUMIN/GLOB SERPL: 1 {RATIO} (ref 1.4–2.6)
ALP SERPL-CCNC: 104 U/L (ref 39–117)
ALP SERPL-CCNC: 148 U/L (ref 43–160)
ALP SERPL-CCNC: 165 U/L (ref 43–160)
ALP SERPL-CCNC: 170 U/L (ref 43–160)
ALP SERPL-CCNC: 68 U/L (ref 39–117)
ALP SERPL-CCNC: 79 U/L (ref 39–117)
ALT SERPL W P-5'-P-CCNC: 13 U/L (ref 1–33)
ALT SERPL W P-5'-P-CCNC: 14 U/L (ref 1–33)
ALT SERPL W P-5'-P-CCNC: 19 U/L (ref 1–33)
ALT SERPL-CCNC: 21 U/L (ref 10–40)
ALT SERPL-CCNC: 32 U/L (ref 10–40)
ALT SERPL-CCNC: 35 U/L (ref 10–40)
ANION GAP SERPL CALC-SCNC: 12 MMOL/L (ref 8–19)
ANION GAP SERPL CALC-SCNC: 14 MMOL/L (ref 8–19)
ANION GAP SERPL CALC-SCNC: 17 MMOL/L (ref 8–19)
ANION GAP SERPL CALCULATED.3IONS-SCNC: 10.8 MMOL/L (ref 5–15)
ANION GAP SERPL CALCULATED.3IONS-SCNC: 11 MMOL/L (ref 5–15)
ANION GAP SERPL CALCULATED.3IONS-SCNC: 11.1 MMOL/L (ref 5–15)
ANION GAP SERPL CALCULATED.3IONS-SCNC: 11.3 MMOL/L (ref 5–15)
ANION GAP SERPL CALCULATED.3IONS-SCNC: 11.4 MMOL/L (ref 5–15)
ANION GAP SERPL CALCULATED.3IONS-SCNC: 12.2 MMOL/L (ref 5–15)
ANION GAP SERPL CALCULATED.3IONS-SCNC: 12.4 MMOL/L (ref 5–15)
ANION GAP SERPL CALCULATED.3IONS-SCNC: 12.7 MMOL/L (ref 5–15)
ANION GAP SERPL CALCULATED.3IONS-SCNC: 14.2 MMOL/L (ref 5–15)
ANISOCYTOSIS BLD QL: ABNORMAL
ANISOCYTOSIS BLD QL: ABNORMAL
AORTIC ARCH: 3.5 CM
AORTIC DIMENSIONLESS INDEX: 0.8 (DI)
APPEARANCE FLD: ABNORMAL
APTT PPP: 38.6 SECONDS (ref 22.7–35.4)
ARTERIAL PATENCY WRIST A: ABNORMAL
ARTERIAL PATENCY WRIST A: POSITIVE
ASCENDING AORTA: 2.5 CM
AST SERPL-CCNC: 20 U/L (ref 1–32)
AST SERPL-CCNC: 21 U/L (ref 15–50)
AST SERPL-CCNC: 22 U/L (ref 1–32)
AST SERPL-CCNC: 31 U/L (ref 15–50)
AST SERPL-CCNC: 31 U/L (ref 1–32)
AST SERPL-CCNC: 40 U/L (ref 15–50)
ATMOSPHERIC PRESS: 754.2 MMHG
B PARAPERT DNA SPEC QL NAA+PROBE: NOT DETECTED
B PERT DNA SPEC QL NAA+PROBE: NOT DETECTED
BACTERIA SPEC AEROBE CULT: NO GROWTH
BACTERIA SPEC AEROBE CULT: NORMAL
BACTERIA SPEC RESP CULT: NORMAL
BACTERIA SPEC RESP CULT: NORMAL
BACTERIA UR QL AUTO: ABNORMAL /HPF
BACTERIA UR QL AUTO: ABNORMAL /HPF
BASE EXCESS BLDA CALC-SCNC: -1.7 MMOL/L (ref 0–2)
BASE EXCESS BLDA CALC-SCNC: -14.6 MMOL/L (ref -2–2)
BASOPHILS # BLD AUTO: 0.01 10*3/MM3 (ref 0–0.2)
BASOPHILS # BLD AUTO: 0.01 10*3/MM3 (ref 0–0.2)
BASOPHILS # BLD AUTO: 0.01 10*3/UL (ref 0–0.2)
BASOPHILS # BLD AUTO: 0.02 10*3/MM3 (ref 0–0.2)
BASOPHILS # BLD AUTO: 0.02 10*3/MM3 (ref 0–0.2)
BASOPHILS # BLD AUTO: 0.05 10*3/MM3 (ref 0–0.2)
BASOPHILS NFR BLD AUTO: 0.1 % (ref 0–1.5)
BASOPHILS NFR BLD AUTO: 0.1 % (ref 0–1.5)
BASOPHILS NFR BLD AUTO: 0.2 % (ref 0–1.5)
BASOPHILS NFR BLD AUTO: 0.2 % (ref 0–1.5)
BASOPHILS NFR BLD AUTO: 0.2 % (ref 0–3)
BASOPHILS NFR BLD AUTO: 0.2 % (ref 0–3)
BASOPHILS NFR BLD AUTO: 0.3 % (ref 0–1.5)
BASOPHILS NFR BLD AUTO: 0.3 % (ref 0–3)
BDY SITE: ABNORMAL
BDY SITE: ABNORMAL
BH CV ECHO MEAS - ACS: 1.9 CM
BH CV ECHO MEAS - AO ARCH DIAM (PROXIMAL TRANS.): 3.5 CM
BH CV ECHO MEAS - AO MAX PG (FULL): 12.7 MMHG
BH CV ECHO MEAS - AO MAX PG: 18.3 MMHG
BH CV ECHO MEAS - AO MEAN PG (FULL): 6 MMHG
BH CV ECHO MEAS - AO MEAN PG: 9 MMHG
BH CV ECHO MEAS - AO ROOT AREA (BSA CORRECTED): 1.2
BH CV ECHO MEAS - AO ROOT AREA: 4.2 CM^2
BH CV ECHO MEAS - AO ROOT DIAM: 2.3 CM
BH CV ECHO MEAS - AO V2 MAX: 214 CM/SEC
BH CV ECHO MEAS - AO V2 MEAN: 140 CM/SEC
BH CV ECHO MEAS - AO V2 VTI: 25.4 CM
BH CV ECHO MEAS - ASC AORTA: 2.5 CM
BH CV ECHO MEAS - AVA(I,A): 2.5 CM^2
BH CV ECHO MEAS - AVA(I,D): 2.5 CM^2
BH CV ECHO MEAS - AVA(V,A): 1.7 CM^2
BH CV ECHO MEAS - AVA(V,D): 1.7 CM^2
BH CV ECHO MEAS - BSA(HAYCOCK): 2.1 M^2
BH CV ECHO MEAS - BSA: 2 M^2
BH CV ECHO MEAS - BZI_BMI: 30.1 KILOGRAMS/M^2
BH CV ECHO MEAS - BZI_METRIC_HEIGHT: 170.2 CM
BH CV ECHO MEAS - BZI_METRIC_WEIGHT: 87.1 KG
BH CV ECHO MEAS - EDV(CUBED): 64 ML
BH CV ECHO MEAS - EDV(MOD-SP2): 76 ML
BH CV ECHO MEAS - EDV(MOD-SP4): 78 ML
BH CV ECHO MEAS - EDV(TEICH): 70 ML
BH CV ECHO MEAS - EF(CUBED): 85.5 %
BH CV ECHO MEAS - EF(MOD-BP): 71.8 %
BH CV ECHO MEAS - EF(MOD-SP2): 73.7 %
BH CV ECHO MEAS - EF(MOD-SP4): 71.8 %
BH CV ECHO MEAS - EF(TEICH): 79.4 %
BH CV ECHO MEAS - ESV(CUBED): 9.3 ML
BH CV ECHO MEAS - ESV(MOD-SP2): 20 ML
BH CV ECHO MEAS - ESV(MOD-SP4): 22 ML
BH CV ECHO MEAS - ESV(TEICH): 14.4 ML
BH CV ECHO MEAS - FS: 47.5 %
BH CV ECHO MEAS - IVS/LVPW: 1.1
BH CV ECHO MEAS - IVSD: 1.4 CM
BH CV ECHO MEAS - LAT PEAK E' VEL: 13.5 CM/SEC
BH CV ECHO MEAS - LV DIASTOLIC VOL/BSA (35-75): 39.2 ML/M^2
BH CV ECHO MEAS - LV MASS(C)D: 197.6 GRAMS
BH CV ECHO MEAS - LV MASS(C)DI: 99.4 GRAMS/M^2
BH CV ECHO MEAS - LV MAX PG: 5.7 MMHG
BH CV ECHO MEAS - LV MEAN PG: 3 MMHG
BH CV ECHO MEAS - LV SYSTOLIC VOL/BSA (12-30): 11.1 ML/M^2
BH CV ECHO MEAS - LV V1 MAX: 119 CM/SEC
BH CV ECHO MEAS - LV V1 MEAN: 83.3 CM/SEC
BH CV ECHO MEAS - LV V1 VTI: 20.3 CM
BH CV ECHO MEAS - LVIDD: 4 CM
BH CV ECHO MEAS - LVIDS: 2.1 CM
BH CV ECHO MEAS - LVLD AP2: 7.1 CM
BH CV ECHO MEAS - LVLD AP4: 6.9 CM
BH CV ECHO MEAS - LVLS AP2: 6.3 CM
BH CV ECHO MEAS - LVLS AP4: 6.8 CM
BH CV ECHO MEAS - LVOT AREA (M): 3.1 CM^2
BH CV ECHO MEAS - LVOT AREA: 3.1 CM^2
BH CV ECHO MEAS - LVOT DIAM: 2 CM
BH CV ECHO MEAS - LVPWD: 1.3 CM
BH CV ECHO MEAS - MED PEAK E' VEL: 12.3 CM/SEC
BH CV ECHO MEAS - MV DEC SLOPE: 742 CM/SEC^2
BH CV ECHO MEAS - MV DEC TIME: 0.22 SEC
BH CV ECHO MEAS - MV E MAX VEL: 106 CM/SEC
BH CV ECHO MEAS - MV MAX PG: 7.2 MMHG
BH CV ECHO MEAS - MV MEAN PG: 3 MMHG
BH CV ECHO MEAS - MV P1/2T MAX VEL: 134 CM/SEC
BH CV ECHO MEAS - MV P1/2T: 52.9 MSEC
BH CV ECHO MEAS - MV V2 MAX: 134 CM/SEC
BH CV ECHO MEAS - MV V2 MEAN: 79.3 CM/SEC
BH CV ECHO MEAS - MV V2 VTI: 21.1 CM
BH CV ECHO MEAS - MVA P1/2T LCG: 1.6 CM^2
BH CV ECHO MEAS - MVA(P1/2T): 4.2 CM^2
BH CV ECHO MEAS - MVA(VTI): 3 CM^2
BH CV ECHO MEAS - PA ACC TIME: 0.13 SEC
BH CV ECHO MEAS - PA MAX PG (FULL): 3 MMHG
BH CV ECHO MEAS - PA MAX PG: 4.8 MMHG
BH CV ECHO MEAS - PA PR(ACCEL): 21.9 MMHG
BH CV ECHO MEAS - PA V2 MAX: 109 CM/SEC
BH CV ECHO MEAS - PVA(V,A): 1.9 CM^2
BH CV ECHO MEAS - PVA(V,D): 1.9 CM^2
BH CV ECHO MEAS - QP/QS: 0.56
BH CV ECHO MEAS - RAP SYSTOLE: 3 MMHG
BH CV ECHO MEAS - RV MAX PG: 1.8 MMHG
BH CV ECHO MEAS - RV MEAN PG: 1 MMHG
BH CV ECHO MEAS - RV V1 MAX: 66.6 CM/SEC
BH CV ECHO MEAS - RV V1 MEAN: 47.1 CM/SEC
BH CV ECHO MEAS - RV V1 VTI: 11.4 CM
BH CV ECHO MEAS - RVOT AREA: 3.1 CM^2
BH CV ECHO MEAS - RVOT DIAM: 2 CM
BH CV ECHO MEAS - RVSP: 19 MMHG
BH CV ECHO MEAS - SI(AO): 53.1 ML/M^2
BH CV ECHO MEAS - SI(CUBED): 27.5 ML/M^2
BH CV ECHO MEAS - SI(LVOT): 32.1 ML/M^2
BH CV ECHO MEAS - SI(MOD-SP2): 28.2 ML/M^2
BH CV ECHO MEAS - SI(MOD-SP4): 28.2 ML/M^2
BH CV ECHO MEAS - SI(TEICH): 28 ML/M^2
BH CV ECHO MEAS - SV(AO): 105.5 ML
BH CV ECHO MEAS - SV(CUBED): 54.7 ML
BH CV ECHO MEAS - SV(LVOT): 63.8 ML
BH CV ECHO MEAS - SV(MOD-SP2): 56 ML
BH CV ECHO MEAS - SV(MOD-SP4): 56 ML
BH CV ECHO MEAS - SV(RVOT): 35.8 ML
BH CV ECHO MEAS - SV(TEICH): 55.6 ML
BH CV ECHO MEAS - TAPSE (>1.6): 2.3 CM
BH CV ECHO MEAS - TR MAX VEL: 200 CM/SEC
BH CV ECHO MEASUREMENTS AVERAGE E/E' RATIO: 8.22
BH CV XLRA - RV BASE: 3.2 CM
BH CV XLRA - RV LENGTH: 7 CM
BH CV XLRA - RV MID: 2.5 CM
BH CV XLRA - TDI S': 21.9 CM/SEC
BILIRUB SERPL-MCNC: 0.2 MG/DL (ref 0–1.2)
BILIRUB SERPL-MCNC: 0.22 MG/DL (ref 0.2–1.3)
BILIRUB SERPL-MCNC: 0.3 MG/DL (ref 0–1.2)
BILIRUB SERPL-MCNC: 0.33 MG/DL (ref 0.2–1.3)
BILIRUB SERPL-MCNC: 0.35 MG/DL (ref 0.2–1.3)
BILIRUB SERPL-MCNC: 0.4 MG/DL (ref 0–1.2)
BILIRUB UR QL STRIP: NEGATIVE
BILIRUB UR QL STRIP: NEGATIVE
BUN SERPL-MCNC: 34 MG/DL (ref 5–25)
BUN SERPL-MCNC: 35 MG/DL (ref 5–25)
BUN SERPL-MCNC: 35 MG/DL (ref 5–25)
BUN SERPL-MCNC: 50 MG/DL (ref 8–23)
BUN SERPL-MCNC: 51 MG/DL (ref 8–23)
BUN SERPL-MCNC: 54 MG/DL (ref 8–23)
BUN SERPL-MCNC: 55 MG/DL (ref 8–23)
BUN SERPL-MCNC: 61 MG/DL (ref 8–23)
BUN SERPL-MCNC: 64 MG/DL (ref 8–23)
BUN SERPL-MCNC: 71 MG/DL (ref 8–23)
BUN SERPL-MCNC: 72 MG/DL (ref 8–23)
BUN SERPL-MCNC: 72 MG/DL (ref 8–23)
BUN/CREAT SERPL: 16 {RATIO} (ref 6–20)
BUN/CREAT SERPL: 17 {RATIO} (ref 6–20)
BUN/CREAT SERPL: 20.7 (ref 7–25)
BUN/CREAT SERPL: 21 {RATIO} (ref 6–20)
BUN/CREAT SERPL: 22.2 (ref 7–25)
BUN/CREAT SERPL: 22.6 (ref 7–25)
BUN/CREAT SERPL: 22.7 (ref 7–25)
BUN/CREAT SERPL: 23.5 (ref 7–25)
BUN/CREAT SERPL: 24.1 (ref 7–25)
BUN/CREAT SERPL: 24.4 (ref 7–25)
BUN/CREAT SERPL: 25.7 (ref 7–25)
BUN/CREAT SERPL: 26.1 (ref 7–25)
BURR CELLS BLD QL SMEAR: ABNORMAL
C PNEUM DNA NPH QL NAA+NON-PROBE: NOT DETECTED
CA-I BLDA-SCNC: 1.45 MMOL/L (ref 1.13–1.32)
CALCIUM SERPL-MCNC: 8.7 MG/DL (ref 8.7–10.4)
CALCIUM SERPL-MCNC: 9.4 MG/DL (ref 8.7–10.4)
CALCIUM SERPL-MCNC: 9.6 MG/DL (ref 8.7–10.4)
CALCIUM SPEC-SCNC: 8.4 MG/DL (ref 8.6–10.5)
CALCIUM SPEC-SCNC: 8.5 MG/DL (ref 8.6–10.5)
CALCIUM SPEC-SCNC: 8.5 MG/DL (ref 8.6–10.5)
CALCIUM SPEC-SCNC: 8.6 MG/DL (ref 8.6–10.5)
CALCIUM SPEC-SCNC: 8.6 MG/DL (ref 8.6–10.5)
CALCIUM SPEC-SCNC: 8.7 MG/DL (ref 8.6–10.5)
CALCIUM SPEC-SCNC: 8.8 MG/DL (ref 8.6–10.5)
CHLORIDE BLDA-SCNC: 101 MMOL/L (ref 98–106)
CHLORIDE SERPL-SCNC: 100 MMOL/L (ref 98–107)
CHLORIDE SERPL-SCNC: 101 MMOL/L (ref 98–107)
CHLORIDE SERPL-SCNC: 102 MMOL/L (ref 99–111)
CHLORIDE SERPL-SCNC: 105 MMOL/L (ref 98–107)
CHLORIDE SERPL-SCNC: 106 MMOL/L (ref 99–111)
CHLORIDE SERPL-SCNC: 108 MMOL/L (ref 99–111)
CHLORIDE SERPL-SCNC: 96 MMOL/L (ref 98–107)
CHLORIDE SERPL-SCNC: 99 MMOL/L (ref 98–107)
CLARITY UR: CLEAR
CLARITY UR: CLEAR
CO2 SERPL-SCNC: 20.8 MMOL/L (ref 22–29)
CO2 SERPL-SCNC: 22.6 MMOL/L (ref 22–29)
CO2 SERPL-SCNC: 22.8 MMOL/L (ref 22–29)
CO2 SERPL-SCNC: 22.9 MMOL/L (ref 22–29)
CO2 SERPL-SCNC: 23.2 MMOL/L (ref 22–29)
CO2 SERPL-SCNC: 23.3 MMOL/L (ref 22–29)
CO2 SERPL-SCNC: 23.6 MMOL/L (ref 22–29)
CO2 SERPL-SCNC: 23.7 MMOL/L (ref 22–29)
CO2 SERPL-SCNC: 24 MMOL/L (ref 22–29)
COHGB MFR BLD: 1 % (ref 0–1.5)
COLOR FLD: YELLOW
COLOR UR: YELLOW
COLOR UR: YELLOW
CONV ABS IMM GRAN: 0.01 10*3/UL (ref 0–0.2)
CONV ABS IMM GRAN: 0.02 10*3/UL (ref 0–0.54)
CONV ABS IMM GRAN: 0.04 10*3/UL (ref 0–0.2)
CONV BRONCHIAL WASH CULTURE: NORMAL
CONV CO2: 22 MMOL/L (ref 22–32)
CONV CO2: 25 MMOL/L (ref 22–32)
CONV CO2: 26 MMOL/L (ref 22–32)
CONV EOSINOPHILS PERCENT BY MANUAL COUNT: 0.3 % (ref 0–7)
CONV IMMATURE GRAN: 0.3 % (ref 0–0.4)
CONV IMMATURE GRAN: 0.3 % (ref 0–1.8)
CONV IMMATURE GRAN: 0.9 % (ref 0–1.8)
CONV TOTAL PROTEIN: 6.6 G/DL (ref 6.3–8.2)
CONV TOTAL PROTEIN: 7 G/DL (ref 6.3–8.2)
CONV TOTAL PROTEIN: 7.5 G/DL (ref 6.3–8.2)
CREAT BLD-MCNC: 1.9 MG/DL (ref 0.6–1.4)
CREAT SERPL-MCNC: 2.11 MG/DL (ref 0.57–1)
CREAT SERPL-MCNC: 2.25 MG/DL (ref 0.57–1)
CREAT SERPL-MCNC: 2.37 MG/DL (ref 0.57–1)
CREAT SERPL-MCNC: 2.41 MG/DL (ref 0.57–1)
CREAT SERPL-MCNC: 2.43 MG/DL (ref 0.57–1)
CREAT SERPL-MCNC: 2.83 MG/DL (ref 0.57–1)
CREAT SERPL-MCNC: 2.94 MG/DL (ref 0.57–1)
CREAT SERPL-MCNC: 2.95 MG/DL (ref 0.57–1)
CREAT SERPL-MCNC: 3.07 MG/DL (ref 0.57–1)
CREAT UR-MCNC: 1.7 MG/DL (ref 0.5–0.9)
CREAT UR-MCNC: 2.09 MG/DL (ref 0.5–0.9)
CREAT UR-MCNC: 2.14 MG/DL (ref 0.5–0.9)
CYTO UR: NORMAL
CYTO UR: NORMAL
D-LACTATE SERPL-SCNC: 1.3 MMOL/L (ref 0.5–2)
DEPRECATED RDW RBC AUTO: 43.8 FL (ref 37–54)
DEPRECATED RDW RBC AUTO: 44 FL (ref 37–54)
DEPRECATED RDW RBC AUTO: 44.2 FL (ref 37–54)
DEPRECATED RDW RBC AUTO: 44.4 FL (ref 37–54)
DEPRECATED RDW RBC AUTO: 45.8 FL (ref 37–54)
DEPRECATED RDW RBC AUTO: 46.3 FL (ref 37–54)
DEPRECATED RDW RBC AUTO: 46.7 FL (ref 37–54)
DEPRECATED RDW RBC AUTO: 47.2 FL (ref 37–54)
DEPRECATED RDW RBC AUTO: 49.1 FL (ref 36.4–46.3)
DEPRECATED RDW RBC AUTO: 57.1 FL (ref 36.4–46.3)
DIGOXIN SERPL-MCNC: 1.6 NG/ML (ref 0.6–1.2)
DIGOXIN SERPL-MCNC: 3.2 NG/ML (ref 0.6–1.2)
EOSINOPHIL # BLD AUTO: 0 10*3/MM3 (ref 0–0.4)
EOSINOPHIL # BLD AUTO: 0.01 10*3/MM3 (ref 0–0.4)
EOSINOPHIL # BLD AUTO: 0.07 10*3/UL (ref 0–0.7)
EOSINOPHIL # BLD AUTO: 0.13 10*3/UL (ref 0–0.7)
EOSINOPHIL # BLD AUTO: 1.9 % (ref 0–7)
EOSINOPHIL # BLD AUTO: 2.9 % (ref 0–7)
EOSINOPHIL # BLD MANUAL: 0.02 10*3/UL (ref 0–0.7)
EOSINOPHIL # BLD MANUAL: 0.13 10*3/MM3 (ref 0–0.4)
EOSINOPHIL NFR BLD AUTO: 0 % (ref 0.3–6.2)
EOSINOPHIL NFR BLD AUTO: 0.1 % (ref 0.3–6.2)
EOSINOPHIL NFR BLD MANUAL: 1 % (ref 0.3–6.2)
EPI CELLS NFR FLD: 65 %
ERYTHROCYTE [DISTWIDTH] IN BLOOD BY AUTOMATED COUNT: 11.9 % (ref 11.7–14.4)
ERYTHROCYTE [DISTWIDTH] IN BLOOD BY AUTOMATED COUNT: 12.3 % (ref 12.3–15.4)
ERYTHROCYTE [DISTWIDTH] IN BLOOD BY AUTOMATED COUNT: 12.5 % (ref 12.3–15.4)
ERYTHROCYTE [DISTWIDTH] IN BLOOD BY AUTOMATED COUNT: 12.6 % (ref 12.3–15.4)
ERYTHROCYTE [DISTWIDTH] IN BLOOD BY AUTOMATED COUNT: 12.9 % (ref 12.3–15.4)
ERYTHROCYTE [DISTWIDTH] IN BLOOD BY AUTOMATED COUNT: 13.9 % (ref 11.7–14.4)
ERYTHROCYTE [DISTWIDTH] IN BLOOD BY AUTOMATED COUNT: 16.7 % (ref 11.5–14.5)
ERYTHROCYTE [DISTWIDTH] IN BLOOD BY AUTOMATED COUNT: 65 FL
FHHB: 88.2 % (ref 0–5)
FLUAV SUBTYP SPEC NAA+PROBE: NOT DETECTED
FLUBV RNA ISLT QL NAA+PROBE: NOT DETECTED
FOLATE SERPL-MCNC: 8.27 NG/ML (ref 4.78–24.2)
FUNGUS WND CULT: NORMAL
FUNGUS WND CULT: NORMAL
GAS FLOW AIRWAY: 15 LPM
GAS FLOW AIRWAY: 2 LPM
GFR SERPL CREATININE-BSD FRML MDRD: 19 ML/MIN/1.73
GFR SERPL CREATININE-BSD FRML MDRD: 19 ML/MIN/1.73
GFR SERPL CREATININE-BSD FRML MDRD: 20 ML/MIN/1.73
GFR SERPL CREATININE-BSD FRML MDRD: 20 ML/MIN/1.73
GFR SERPL CREATININE-BSD FRML MDRD: 24 ML/MIN/1.73
GFR SERPL CREATININE-BSD FRML MDRD: 25 ML/MIN/1.73
GFR SERPL CREATININE-BSD FRML MDRD: 25 ML/MIN/1.73
GFR SERPL CREATININE-BSD FRML MDRD: 27 ML/MIN/1.73
GFR SERPL CREATININE-BSD FRML MDRD: 29 ML/MIN/1.73
GFR SERPLBLD BASED ON 1.73 SQ M-ARVRAT: 28 ML/MIN/{1.73_M2}
GFR SERPLBLD BASED ON 1.73 SQ M-ARVRAT: 28 ML/MIN/{1.73_M2}
GFR SERPLBLD BASED ON 1.73 SQ M-ARVRAT: 32 ML/MIN/{1.73_M2}
GFR SERPLBLD BASED ON 1.73 SQ M-ARVRAT: 37 ML/MIN/{1.73_M2}
GIE STN SPEC: NORMAL
GIE STN SPEC: NORMAL
GLOBULIN UR ELPH-MCNC: 3.2 GM/DL
GLOBULIN UR ELPH-MCNC: 3.3 G/DL (ref 2–3.5)
GLOBULIN UR ELPH-MCNC: 3.4 GM/DL
GLOBULIN UR ELPH-MCNC: 3.5 G/DL (ref 2–3.5)
GLOBULIN UR ELPH-MCNC: 3.7 G/DL (ref 2–3.5)
GLOBULIN UR ELPH-MCNC: 3.7 GM/DL
GLUCOSE BLD-MCNC: 196 MG/DL (ref 65–99)
GLUCOSE BLDA-MCNC: 238 MMOL/L (ref 65–99)
GLUCOSE BLDC GLUCOMTR-MCNC: 113 MG/DL (ref 70–130)
GLUCOSE BLDC GLUCOMTR-MCNC: 138 MG/DL (ref 70–130)
GLUCOSE BLDC GLUCOMTR-MCNC: 151 MG/DL (ref 70–130)
GLUCOSE BLDC GLUCOMTR-MCNC: 160 MG/DL (ref 70–130)
GLUCOSE BLDC GLUCOMTR-MCNC: 169 MG/DL (ref 70–130)
GLUCOSE BLDC GLUCOMTR-MCNC: 172 MG/DL (ref 70–130)
GLUCOSE BLDC GLUCOMTR-MCNC: 192 MG/DL (ref 70–130)
GLUCOSE BLDC GLUCOMTR-MCNC: 193 MG/DL (ref 70–130)
GLUCOSE BLDC GLUCOMTR-MCNC: 213 MG/DL (ref 70–130)
GLUCOSE BLDC GLUCOMTR-MCNC: 223 MG/DL (ref 70–130)
GLUCOSE BLDC GLUCOMTR-MCNC: 235 MG/DL (ref 70–130)
GLUCOSE BLDC GLUCOMTR-MCNC: 249 MG/DL (ref 70–130)
GLUCOSE BLDC GLUCOMTR-MCNC: 252 MG/DL (ref 70–130)
GLUCOSE BLDC GLUCOMTR-MCNC: 256 MG/DL (ref 70–130)
GLUCOSE BLDC GLUCOMTR-MCNC: 265 MG/DL (ref 70–130)
GLUCOSE BLDC GLUCOMTR-MCNC: 272 MG/DL (ref 70–130)
GLUCOSE BLDC GLUCOMTR-MCNC: 291 MG/DL (ref 70–130)
GLUCOSE BLDC GLUCOMTR-MCNC: 303 MG/DL (ref 70–130)
GLUCOSE BLDC GLUCOMTR-MCNC: 312 MG/DL (ref 70–130)
GLUCOSE BLDC GLUCOMTR-MCNC: 324 MG/DL (ref 70–130)
GLUCOSE BLDC GLUCOMTR-MCNC: 331 MG/DL (ref 70–130)
GLUCOSE BLDC GLUCOMTR-MCNC: 342 MG/DL (ref 70–130)
GLUCOSE BLDC GLUCOMTR-MCNC: 344 MG/DL (ref 70–130)
GLUCOSE BLDC GLUCOMTR-MCNC: 350 MG/DL (ref 70–130)
GLUCOSE BLDC GLUCOMTR-MCNC: 376 MG/DL (ref 70–130)
GLUCOSE BLDC GLUCOMTR-MCNC: 390 MG/DL (ref 70–130)
GLUCOSE BLDC GLUCOMTR-MCNC: 395 MG/DL (ref 70–130)
GLUCOSE BLDC GLUCOMTR-MCNC: 413 MG/DL (ref 70–130)
GLUCOSE BLDC GLUCOMTR-MCNC: 435 MG/DL (ref 70–130)
GLUCOSE BLDC GLUCOMTR-MCNC: 441 MG/DL (ref 70–130)
GLUCOSE BLDC GLUCOMTR-MCNC: 452 MG/DL (ref 70–130)
GLUCOSE BLDC GLUCOMTR-MCNC: 535 MG/DL (ref 70–130)
GLUCOSE BLDC GLUCOMTR-MCNC: 585 MG/DL (ref 70–130)
GLUCOSE BLDC GLUCOMTR-MCNC: >599 MG/DL (ref 70–130)
GLUCOSE SERPL-MCNC: 184 MG/DL (ref 65–99)
GLUCOSE SERPL-MCNC: 184 MG/DL (ref 65–99)
GLUCOSE SERPL-MCNC: 206 MG/DL (ref 65–99)
GLUCOSE SERPL-MCNC: 212 MG/DL (ref 65–99)
GLUCOSE SERPL-MCNC: 260 MG/DL (ref 65–99)
GLUCOSE SERPL-MCNC: 277 MG/DL (ref 65–99)
GLUCOSE SERPL-MCNC: 279 MG/DL (ref 65–99)
GLUCOSE SERPL-MCNC: 294 MG/DL (ref 65–99)
GLUCOSE SERPL-MCNC: 295 MG/DL (ref 65–99)
GLUCOSE SERPL-MCNC: 391 MG/DL (ref 65–99)
GLUCOSE SERPL-MCNC: 460 MG/DL (ref 65–99)
GLUCOSE SERPL-MCNC: 72 MG/DL (ref 65–99)
GLUCOSE UR STRIP-MCNC: NEGATIVE MG/DL
GLUCOSE UR STRIP-MCNC: NEGATIVE MG/DL
GRAM STN SPEC: NORMAL
HADV DNA SPEC NAA+PROBE: NOT DETECTED
HBA1C MFR BLD: 8.4 % (ref 4.8–5.6)
HBA1C MFR BLD: 8.8 G/DL (ref 12–16)
HCO3 BLDA-SCNC: 18.8 MMOL/L (ref 22–26)
HCO3 BLDA-SCNC: 24.2 MMOL/L (ref 22–28)
HCOV 229E RNA SPEC QL NAA+PROBE: NOT DETECTED
HCOV HKU1 RNA SPEC QL NAA+PROBE: NOT DETECTED
HCOV NL63 RNA SPEC QL NAA+PROBE: NOT DETECTED
HCOV OC43 RNA SPEC QL NAA+PROBE: NOT DETECTED
HCT VFR BLD AUTO: 25.2 % (ref 34–46.6)
HCT VFR BLD AUTO: 25.9 % (ref 34–46.6)
HCT VFR BLD AUTO: 26 % (ref 34–46.6)
HCT VFR BLD AUTO: 26.3 % (ref 34–46.6)
HCT VFR BLD AUTO: 27.4 % (ref 34–46.6)
HCT VFR BLD AUTO: 27.5 % (ref 34–46.6)
HCT VFR BLD AUTO: 27.7 % (ref 34–46.6)
HCT VFR BLD AUTO: 28.2 % (ref 34–46.6)
HCT VFR BLD AUTO: 30 % (ref 37–47)
HCT VFR BLD AUTO: 35.6 % (ref 37–47)
HCT VFR BLD AUTO: 40.5 % (ref 37–47)
HGB BLD-MCNC: 11.3 G/DL (ref 12–16)
HGB BLD-MCNC: 12.8 G/DL (ref 12–16)
HGB BLD-MCNC: 8.2 G/DL (ref 12–15.9)
HGB BLD-MCNC: 8.6 G/DL (ref 12–15.9)
HGB BLD-MCNC: 8.8 G/DL (ref 12–15.9)
HGB BLD-MCNC: 8.8 G/DL (ref 12–15.9)
HGB BLD-MCNC: 9.1 G/DL (ref 12–15.9)
HGB BLD-MCNC: 9.1 G/DL (ref 12–15.9)
HGB BLDA-MCNC: 10.4 G/DL (ref 11.7–14.6)
HGB RETIC QN AUTO: 23 PG (ref 29.8–36.1)
HGB UR QL STRIP.AUTO: NEGATIVE
HGB UR QL STRIP.AUTO: NEGATIVE
HMPV RNA NPH QL NAA+NON-PROBE: NOT DETECTED
HOLD SPECIMEN: NORMAL
HPIV1 RNA SPEC QL NAA+PROBE: NOT DETECTED
HPIV2 RNA SPEC QL NAA+PROBE: NOT DETECTED
HPIV3 RNA NPH QL NAA+PROBE: NOT DETECTED
HPIV4 P GENE NPH QL NAA+PROBE: NOT DETECTED
HYALINE CASTS UR QL AUTO: ABNORMAL /LPF
HYALINE CASTS UR QL AUTO: ABNORMAL /LPF
IMM GRANULOCYTES # BLD AUTO: 0.12 10*3/MM3 (ref 0–0.05)
IMM GRANULOCYTES # BLD AUTO: 0.15 10*3/MM3 (ref 0–0.05)
IMM GRANULOCYTES # BLD AUTO: 0.2 10*3/MM3 (ref 0–0.05)
IMM GRANULOCYTES # BLD AUTO: 0.21 10*3/MM3 (ref 0–0.05)
IMM GRANULOCYTES # BLD AUTO: 0.51 10*3/MM3 (ref 0–0.05)
IMM GRANULOCYTES NFR BLD AUTO: 1.1 % (ref 0–0.5)
IMM GRANULOCYTES NFR BLD AUTO: 1.2 % (ref 0–0.5)
IMM GRANULOCYTES NFR BLD AUTO: 1.3 % (ref 0–0.5)
IMM GRANULOCYTES NFR BLD AUTO: 1.6 % (ref 0–0.5)
IMM GRANULOCYTES NFR BLD AUTO: 4.2 % (ref 0–0.5)
IMM RETICS NFR: 24 % (ref 3–15.8)
INHALED O2 CONCENTRATION: 100 %
INR PPP: 1.75 (ref 0.9–1.1)
KETONES UR QL STRIP: ABNORMAL
KETONES UR QL STRIP: NEGATIVE
L PNEUMO1 AG UR QL IA: NEGATIVE
LAB AP CASE REPORT: NORMAL
LAB AP CASE REPORT: NORMAL
LAB AP CLINICAL INFORMATION: NORMAL
LAB AP NON-GYN SPECIMEN ADEQUACY: NORMAL
LACTATE BLDA-SCNC: 15.03 MMOL/L (ref 0.5–2)
LEFT ATRIUM VOLUME INDEX: 20 ML/M2
LEUKOCYTE ESTERASE UR QL STRIP.AUTO: ABNORMAL
LEUKOCYTE ESTERASE UR QL STRIP.AUTO: NEGATIVE
LYMPHOCYTES # BLD AUTO: 0.39 10*3/MM3 (ref 0.7–3.1)
LYMPHOCYTES # BLD AUTO: 0.4 10*3/MM3 (ref 0.7–3.1)
LYMPHOCYTES # BLD AUTO: 0.46 10*3/UL (ref 1–5)
LYMPHOCYTES # BLD AUTO: 0.5 10*3/MM3 (ref 0.7–3.1)
LYMPHOCYTES # BLD AUTO: 0.51 10*3/MM3 (ref 0.7–3.1)
LYMPHOCYTES # BLD AUTO: 0.83 10*3/MM3 (ref 0.7–3.1)
LYMPHOCYTES # BLD AUTO: 0.86 10*3/UL (ref 1–5)
LYMPHOCYTES # BLD AUTO: 1.03 10*3/UL (ref 1–5)
LYMPHOCYTES # BLD MANUAL: 0.65 10*3/MM3 (ref 0.7–3.1)
LYMPHOCYTES # BLD MANUAL: 0.8 10*3/MM3 (ref 0.7–3.1)
LYMPHOCYTES # BLD MANUAL: 1.11 10*3/MM3 (ref 0.7–3.1)
LYMPHOCYTES NFR BLD AUTO: 23 % (ref 20–45)
LYMPHOCYTES NFR BLD AUTO: 23 % (ref 20–45)
LYMPHOCYTES NFR BLD AUTO: 3 % (ref 19.6–45.3)
LYMPHOCYTES NFR BLD AUTO: 3 % (ref 19.6–45.3)
LYMPHOCYTES NFR BLD AUTO: 4.1 % (ref 19.6–45.3)
LYMPHOCYTES NFR BLD AUTO: 4.2 % (ref 19.6–45.3)
LYMPHOCYTES NFR BLD AUTO: 6.7 % (ref 19.6–45.3)
LYMPHOCYTES NFR BLD AUTO: 6.9 % (ref 20–45)
LYMPHOCYTES NFR BLD MANUAL: 10.1 % (ref 5–12)
LYMPHOCYTES NFR BLD MANUAL: 12 % (ref 5–12)
LYMPHOCYTES NFR BLD MANUAL: 4 % (ref 19.6–45.3)
LYMPHOCYTES NFR BLD MANUAL: 6 % (ref 19.6–45.3)
LYMPHOCYTES NFR BLD MANUAL: 6 % (ref 5–12)
LYMPHOCYTES NFR BLD MANUAL: 6.1 % (ref 19.6–45.3)
LYMPHOCYTES NFR FLD MANUAL: 10 %
LYMPHOCYTES NFR FLD MANUAL: 18 %
M PNEUMO IGG SER IA-ACNC: NOT DETECTED
MACROCYTES BLD QL SMEAR: ABNORMAL
MACROCYTES BLD QL SMEAR: ABNORMAL
MACROPHAGE FLUID: 6 /100{WBCS}
MAGNESIUM SERPL-MCNC: 1.8 MG/DL (ref 1.6–2.4)
MAGNESIUM SERPL-MCNC: 1.9 MG/DL (ref 1.6–2.4)
MAGNESIUM SERPL-MCNC: 2.1 MG/DL (ref 1.6–2.4)
MAGNESIUM SERPL-MCNC: 2.2 MG/DL (ref 1.6–2.4)
MAXIMAL PREDICTED HEART RATE: 157 BPM
MCH RBC QN AUTO: 31.1 PG (ref 26.6–33)
MCH RBC QN AUTO: 31.1 PG (ref 27–31)
MCH RBC QN AUTO: 31.8 PG (ref 26.6–33)
MCH RBC QN AUTO: 32 PG (ref 26.6–33)
MCH RBC QN AUTO: 32.2 PG (ref 26.6–33)
MCH RBC QN AUTO: 32.3 PG (ref 26.6–33)
MCH RBC QN AUTO: 32.7 PG (ref 26.6–33)
MCH RBC QN AUTO: 32.9 PG (ref 26.6–33)
MCH RBC QN AUTO: 33 PG (ref 26.6–33)
MCH RBC QN AUTO: 35 PG (ref 27–31)
MCH RBC QN AUTO: 35.4 PG (ref 27–31)
MCHC RBC AUTO-ENTMCNC: 29.3 G/DL (ref 33–37)
MCHC RBC AUTO-ENTMCNC: 31.2 G/DL (ref 31.5–35.7)
MCHC RBC AUTO-ENTMCNC: 31.5 G/DL (ref 31.5–35.7)
MCHC RBC AUTO-ENTMCNC: 31.6 G/DL (ref 33–37)
MCHC RBC AUTO-ENTMCNC: 31.7 G/DL (ref 33–37)
MCHC RBC AUTO-ENTMCNC: 31.8 G/DL (ref 31.5–35.7)
MCHC RBC AUTO-ENTMCNC: 32.1 G/DL (ref 31.5–35.7)
MCHC RBC AUTO-ENTMCNC: 32.3 G/DL (ref 31.5–35.7)
MCHC RBC AUTO-ENTMCNC: 32.5 G/DL (ref 31.5–35.7)
MCHC RBC AUTO-ENTMCNC: 33.1 G/DL (ref 31.5–35.7)
MCHC RBC AUTO-ENTMCNC: 33.2 G/DL (ref 31.5–35.7)
MCV RBC AUTO: 100.4 FL (ref 79–97)
MCV RBC AUTO: 100.8 FL (ref 79–97)
MCV RBC AUTO: 101.4 FL (ref 79–97)
MCV RBC AUTO: 103.7 FL (ref 79–97)
MCV RBC AUTO: 106 FL (ref 81–99)
MCV RBC AUTO: 110.7 FL (ref 81–99)
MCV RBC AUTO: 111.6 FL (ref 81–99)
MCV RBC AUTO: 97.4 FL (ref 79–97)
MCV RBC AUTO: 98.4 FL (ref 79–97)
MCV RBC AUTO: 99.3 FL (ref 79–97)
MCV RBC AUTO: 99.6 FL (ref 79–97)
METHGB BLD QL: 1.8 % (ref 0–1.5)
METHOD: ABNORMAL
MODALITY: ABNORMAL
MODALITY: ABNORMAL
MONOCYTES # BLD AUTO: 0.19 10*3/MM3 (ref 0.1–0.9)
MONOCYTES # BLD AUTO: 0.35 10*3/MM3 (ref 0.1–0.9)
MONOCYTES # BLD AUTO: 0.37 10*3/UL (ref 0.2–1.2)
MONOCYTES # BLD AUTO: 0.58 10*3/MM3 (ref 0.1–0.9)
MONOCYTES # BLD AUTO: 0.7 10*3/UL (ref 0.2–1.2)
MONOCYTES # BLD AUTO: 0.84 10*3/UL (ref 0.2–1.2)
MONOCYTES # BLD AUTO: 0.87 10*3/MM3 (ref 0.1–0.9)
MONOCYTES # BLD AUTO: 0.98 10*3/MM3 (ref 0.1–0.9)
MONOCYTES # BLD AUTO: 1.33 10*3/MM3 (ref 0.1–0.9)
MONOCYTES # BLD AUTO: 2.22 10*3/MM3 (ref 0.1–0.9)
MONOCYTES # BLD AUTO: 2.26 10*3/MM3 (ref 0.1–0.9)
MONOCYTES NFR BLD AUTO: 13.8 % (ref 5–12)
MONOCYTES NFR BLD AUTO: 18.7 % (ref 3–10)
MONOCYTES NFR BLD AUTO: 18.8 % (ref 3–10)
MONOCYTES NFR BLD AUTO: 2 % (ref 5–12)
MONOCYTES NFR BLD AUTO: 2.6 % (ref 5–12)
MONOCYTES NFR BLD AUTO: 4.8 % (ref 5–12)
MONOCYTES NFR BLD AUTO: 7 % (ref 5–12)
MONOCYTES NFR BLD MANUAL: 5.6 % (ref 3–10)
MONOCYTES NFR FLD: 2 %
MRSA DNA SPEC QL NAA+PROBE: NORMAL
MYCOBACTERIUM SPEC CULT: NORMAL
MYCOBACTERIUM SPEC CULT: NORMAL
NEUTROPHILS # BLD AUTO: 10.89 10*3/MM3 (ref 1.7–7)
NEUTROPHILS # BLD AUTO: 14.71 10*3/MM3 (ref 1.7–7)
NEUTROPHILS # BLD AUTO: 15.16 10*3/MM3 (ref 1.7–7)
NEUTROPHILS # BLD AUTO: 2.09 10*3/UL (ref 2–8)
NEUTROPHILS # BLD AUTO: 2.42 10*3/UL (ref 2–8)
NEUTROPHILS # BLD AUTO: 5.77 10*3/UL (ref 2–8)
NEUTROPHILS NFR BLD AUTO: 10.45 10*3/MM3 (ref 1.7–7)
NEUTROPHILS NFR BLD AUTO: 10.46 10*3/MM3 (ref 1.7–7)
NEUTROPHILS NFR BLD AUTO: 12.32 10*3/MM3 (ref 1.7–7)
NEUTROPHILS NFR BLD AUTO: 13.4 10*3/MM3 (ref 1.7–7)
NEUTROPHILS NFR BLD AUTO: 54.2 % (ref 30–85)
NEUTROPHILS NFR BLD AUTO: 55.8 % (ref 30–85)
NEUTROPHILS NFR BLD AUTO: 8.93 10*3/MM3 (ref 1.7–7)
NEUTROPHILS NFR BLD AUTO: 81.5 % (ref 42.7–76)
NEUTROPHILS NFR BLD AUTO: 84.5 % (ref 42.7–76)
NEUTROPHILS NFR BLD AUTO: 86.6 % (ref 42.7–76)
NEUTROPHILS NFR BLD AUTO: 92.6 % (ref 42.7–76)
NEUTROPHILS NFR BLD AUTO: 93.2 % (ref 42.7–76)
NEUTROPHILS NFR BLD MANUAL: 82 % (ref 42.7–76)
NEUTROPHILS NFR BLD MANUAL: 82.8 % (ref 42.7–76)
NEUTROPHILS NFR BLD MANUAL: 86.7 % (ref 30–85)
NEUTROPHILS NFR BLD MANUAL: 90 % (ref 42.7–76)
NEUTROPHILS NFR FLD MANUAL: 11 %
NEUTROPHILS NFR FLD MANUAL: 88 %
NIGHT BLUE STAIN TISS: NORMAL
NIGHT BLUE STAIN TISS: NORMAL
NITRITE UR QL STRIP: NEGATIVE
NITRITE UR QL STRIP: NEGATIVE
NOTE: ABNORMAL
NRBC BLD AUTO-RTO: 0 /100 WBC (ref 0–0.2)
NRBC BLD AUTO-RTO: 0 /100 WBC (ref 0–0.2)
NRBC BLD AUTO-RTO: 0.1 /100 WBC (ref 0–0.2)
NRBC CBCN: 0 % (ref 0–0.7)
NRBC CBCN: 0 % (ref 0–0.7)
NUC CELL # FLD: 445 /MM3
OSMOLALITY SERPL CALC.SUM OF ELEC: 297 MOSM/KG (ref 273–304)
OSMOLALITY SERPL CALC.SUM OF ELEC: 299 MOSM/KG (ref 273–304)
OSMOLALITY SERPL CALC.SUM OF ELEC: 302 MOSM/KG (ref 273–304)
OVALOCYTES BLD QL SMEAR: ABNORMAL
OXYHGB MFR BLDV: 9 % (ref 94–99)
PATH REPORT.FINAL DX SPEC: NORMAL
PATH REPORT.FINAL DX SPEC: NORMAL
PATH REPORT.GROSS SPEC: NORMAL
PATH REPORT.GROSS SPEC: NORMAL
PCO2 BLDA: 45.6 MM HG (ref 35–45)
PCO2 BLDA: 97.3 MM HG (ref 35–45)
PH BLDA: 6.91 PH UNITS (ref 7.35–7.45)
PH BLDA: 7.33 PH UNITS (ref 7.35–7.45)
PH UR STRIP.AUTO: <=5 [PH] (ref 5–8)
PH UR STRIP.AUTO: <=5 [PH] (ref 5–8)
PHOSPHATE SERPL-MCNC: 3.3 MG/DL (ref 2.5–4.5)
PHOSPHATE SERPL-MCNC: 3.6 MG/DL (ref 2.5–4.5)
PLAT MORPH BLD: NORMAL
PLATELET # BLD AUTO: 131 10*3/UL (ref 130–400)
PLATELET # BLD AUTO: 135 10*3/UL (ref 130–400)
PLATELET # BLD AUTO: 148 10*3/UL (ref 130–400)
PLATELET # BLD AUTO: 166 10*3/MM3 (ref 140–450)
PLATELET # BLD AUTO: 181 10*3/MM3 (ref 140–450)
PLATELET # BLD AUTO: 184 10*3/MM3 (ref 140–450)
PLATELET # BLD AUTO: 188 10*3/MM3 (ref 140–450)
PLATELET # BLD AUTO: 189 10*3/MM3 (ref 140–450)
PLATELET # BLD AUTO: 193 10*3/MM3 (ref 140–450)
PLATELET # BLD AUTO: 195 10*3/MM3 (ref 140–450)
PLATELET # BLD AUTO: 207 10*3/MM3 (ref 140–450)
PMV BLD AUTO: 10.3 FL (ref 6–12)
PMV BLD AUTO: 10.3 FL (ref 6–12)
PMV BLD AUTO: 10.6 FL (ref 6–12)
PMV BLD AUTO: 10.6 FL (ref 7.4–10.4)
PMV BLD AUTO: 10.7 FL (ref 6–12)
PMV BLD AUTO: 10.8 FL (ref 6–12)
PMV BLD AUTO: 10.8 FL (ref 9.4–12.3)
PMV BLD AUTO: 10.9 FL (ref 6–12)
PMV BLD AUTO: 10.9 FL (ref 6–12)
PMV BLD AUTO: 11 FL (ref 6–12)
PMV BLD AUTO: 12 FL (ref 9.4–12.3)
PO2 BLD: <41 MM[HG] (ref 0–500)
PO2 BLDA: 69.9 MM HG (ref 80–100)
PO2 BLDA: <40.5 MM HG (ref 80–100)
POIKILOCYTOSIS BLD QL SMEAR: ABNORMAL
POTASSIUM BLDA-SCNC: 5.48 MMOL/L (ref 3.5–5)
POTASSIUM SERPL-SCNC: 4 MMOL/L (ref 3.5–5.2)
POTASSIUM SERPL-SCNC: 4.4 MMOL/L (ref 3.5–5.2)
POTASSIUM SERPL-SCNC: 4.4 MMOL/L (ref 3.5–5.3)
POTASSIUM SERPL-SCNC: 4.5 MMOL/L (ref 3.5–5.2)
POTASSIUM SERPL-SCNC: 4.6 MMOL/L (ref 3.5–5.2)
POTASSIUM SERPL-SCNC: 4.6 MMOL/L (ref 3.5–5.2)
POTASSIUM SERPL-SCNC: 4.8 MMOL/L (ref 3.5–5.2)
POTASSIUM SERPL-SCNC: 4.9 MMOL/L (ref 3.5–5.3)
POTASSIUM SERPL-SCNC: 5.2 MMOL/L (ref 3.5–5.3)
PROCALCITONIN SERPL-MCNC: 2.04 NG/ML (ref 0–0.25)
PROT SERPL-MCNC: 5.9 G/DL (ref 6–8.5)
PROT SERPL-MCNC: 6.2 G/DL (ref 6–8.5)
PROT SERPL-MCNC: 6.3 G/DL (ref 6–8.5)
PROT UR QL STRIP: ABNORMAL
PROT UR QL STRIP: ABNORMAL
PROTHROMBIN TIME: 20.2 SECONDS (ref 11.7–14.2)
QT INTERVAL: 319 MS
QT INTERVAL: 335 MS
QT INTERVAL: 361 MS
QT INTERVAL: 376 MS
QT INTERVAL: 401 MS
QT INTERVAL: 406 MS
QT INTERVAL: 458 MS
QT INTERVAL: 464 MS
QT INTERVAL: 498 MS
RBC # BLD AUTO: 2.56 10*6/MM3 (ref 3.77–5.28)
RBC # BLD AUTO: 2.58 10*6/MM3 (ref 3.77–5.28)
RBC # BLD AUTO: 2.64 10*6/MM3 (ref 3.77–5.28)
RBC # BLD AUTO: 2.66 10*6/MM3 (ref 3.77–5.28)
RBC # BLD AUTO: 2.67 10*6/MM3 (ref 3.77–5.28)
RBC # BLD AUTO: 2.73 10*6/MM3 (ref 3.77–5.28)
RBC # BLD AUTO: 2.77 10*6/MM3 (ref 3.77–5.28)
RBC # BLD AUTO: 2.78 10*6/MM3 (ref 3.77–5.28)
RBC # BLD AUTO: 3.19 10*6/UL (ref 4.2–5.4)
RBC # BLD AUTO: 3.66 10*6/UL (ref 4.2–5.4)
RBC # FLD AUTO: 210 /MM3
RBC # UR: ABNORMAL /HPF
RBC # UR: ABNORMAL /HPF
RBC MORPH BLD: 2.83 10*6/UL (ref 4.2–5.4)
RBC MORPH BLD: NORMAL
REF LAB TEST METHOD: ABNORMAL
REF LAB TEST METHOD: ABNORMAL
RETICS # AUTO: 0.06 10*6/MM3 (ref 0.02–0.13)
RETICS/RBC NFR AUTO: 2.15 % (ref 0.7–1.9)
RHINOVIRUS RNA SPEC NAA+PROBE: NOT DETECTED
RSV RNA NPH QL NAA+NON-PROBE: NOT DETECTED
S PNEUM AG SPEC QL LA: NEGATIVE
SAO2 % BLDCOA: 92.4 % (ref 92–99)
SAO2 % BLDCOA: ABNORMAL %
SARS-COV-2 ORF1AB RESP QL NAA+PROBE: NOT DETECTED
SARS-COV-2 RNA SPEC QL NAA+PROBE: NOT DETECTED
SARS-COV-2 RNA SPEC QL NAA+PROBE: NOT DETECTED
SINUS: 2.5 CM
SODIUM BLDA-SCNC: 139.3 MMOL/L (ref 136–146)
SODIUM SERPL-SCNC: 130 MMOL/L (ref 136–145)
SODIUM SERPL-SCNC: 131 MMOL/L (ref 136–145)
SODIUM SERPL-SCNC: 131 MMOL/L (ref 136–145)
SODIUM SERPL-SCNC: 134 MMOL/L (ref 136–145)
SODIUM SERPL-SCNC: 135 MMOL/L (ref 136–145)
SODIUM SERPL-SCNC: 135 MMOL/L (ref 136–145)
SODIUM SERPL-SCNC: 138 MMOL/L (ref 135–147)
SODIUM SERPL-SCNC: 140 MMOL/L (ref 135–147)
SODIUM SERPL-SCNC: 140 MMOL/L (ref 135–147)
SODIUM SERPL-SCNC: 141 MMOL/L (ref 136–145)
SP GR UR STRIP: 1.01 (ref 1–1.03)
SP GR UR STRIP: 1.02 (ref 1–1.03)
SQUAMOUS #/AREA URNS HPF: ABNORMAL /HPF
SQUAMOUS #/AREA URNS HPF: ABNORMAL /HPF
STJ: 2.2 CM
STRESS TARGET HR: 133 BPM
TOTAL RATE: 18 BREATHS/MINUTE
UROBILINOGEN UR QL STRIP: ABNORMAL
UROBILINOGEN UR QL STRIP: ABNORMAL
VISUAL PRESENCE OF BLOOD: NORMAL
VIT B12 BLD-MCNC: 1741 PG/ML (ref 211–946)
WBC # BLD AUTO: 12.08 10*3/MM3 (ref 3.4–10.8)
WBC # BLD AUTO: 12.37 10*3/MM3 (ref 3.4–10.8)
WBC # BLD AUTO: 13.19 10*3/MM3 (ref 3.4–10.8)
WBC # BLD AUTO: 13.22 10*3/MM3 (ref 3.4–10.8)
WBC # BLD AUTO: 16.34 10*3/MM3 (ref 3.4–10.8)
WBC # BLD AUTO: 16.43 10*3/MM3 (ref 3.4–10.8)
WBC # BLD AUTO: 18.49 10*3/MM3 (ref 3.4–10.8)
WBC # BLD AUTO: 3.74 10*3/UL (ref 4.8–10.8)
WBC # BLD AUTO: 4.47 10*3/UL (ref 4.8–10.8)
WBC # BLD AUTO: 6.65 10*3/UL (ref 4.8–10.8)
WBC # BLD AUTO: 9.65 10*3/MM3 (ref 3.4–10.8)
WBC MORPH BLD: NORMAL
WBC UR QL AUTO: ABNORMAL /HPF
WBC UR QL AUTO: ABNORMAL /HPF
WHOLE BLOOD HOLD SPECIMEN: NORMAL

## 2021-01-01 PROCEDURE — 87071 CULTURE AEROBIC QUANT OTHER: CPT | Performed by: INTERNAL MEDICINE

## 2021-01-01 PROCEDURE — 99232 SBSQ HOSP IP/OBS MODERATE 35: CPT | Performed by: INTERNAL MEDICINE

## 2021-01-01 PROCEDURE — 85730 THROMBOPLASTIN TIME PARTIAL: CPT | Performed by: THORACIC SURGERY (CARDIOTHORACIC VASCULAR SURGERY)

## 2021-01-01 PROCEDURE — 25010000002 AMIODARONE IN DEXTROSE 5% 360-4.14 MG/200ML-% SOLUTION: Performed by: INTERNAL MEDICINE

## 2021-01-01 PROCEDURE — 82962 GLUCOSE BLOOD TEST: CPT

## 2021-01-01 PROCEDURE — 63710000001 INSULIN LISPRO (HUMAN) PER 5 UNITS: Performed by: HOSPITALIST

## 2021-01-01 PROCEDURE — 25010000002 PIPERACILLIN SOD-TAZOBACTAM PER 1 G: Performed by: INTERNAL MEDICINE

## 2021-01-01 PROCEDURE — 93010 ELECTROCARDIOGRAM REPORT: CPT | Performed by: INTERNAL MEDICINE

## 2021-01-01 PROCEDURE — 87206 SMEAR FLUORESCENT/ACID STAI: CPT | Performed by: INTERNAL MEDICINE

## 2021-01-01 PROCEDURE — 94799 UNLISTED PULMONARY SVC/PX: CPT

## 2021-01-01 PROCEDURE — 83735 ASSAY OF MAGNESIUM: CPT | Performed by: INTERNAL MEDICINE

## 2021-01-01 PROCEDURE — 63710000001 PREDNISONE PER 1 MG: Performed by: INTERNAL MEDICINE

## 2021-01-01 PROCEDURE — 63710000001 INSULIN GLARGINE PER 5 UNITS: Performed by: INTERNAL MEDICINE

## 2021-01-01 PROCEDURE — 63710000001 INSULIN LISPRO (HUMAN) PER 5 UNITS: Performed by: NURSE PRACTITIONER

## 2021-01-01 PROCEDURE — 76775 US EXAM ABDO BACK WALL LIM: CPT

## 2021-01-01 PROCEDURE — 25010000002 EPINEPHRINE 5 MG/250 ML: Performed by: EMERGENCY MEDICINE

## 2021-01-01 PROCEDURE — 94760 N-INVAS EAR/PLS OXIMETRY 1: CPT

## 2021-01-01 PROCEDURE — 93306 TTE W/DOPPLER COMPLETE: CPT

## 2021-01-01 PROCEDURE — 25010000002 PROPOFOL 10 MG/ML EMULSION: Performed by: NURSE ANESTHETIST, CERTIFIED REGISTERED

## 2021-01-01 PROCEDURE — 87070 CULTURE OTHR SPECIMN AEROBIC: CPT | Performed by: NURSE PRACTITIONER

## 2021-01-01 PROCEDURE — 94640 AIRWAY INHALATION TREATMENT: CPT

## 2021-01-01 PROCEDURE — 87070 CULTURE OTHR SPECIMN AEROBIC: CPT | Performed by: INTERNAL MEDICINE

## 2021-01-01 PROCEDURE — 73502 X-RAY EXAM HIP UNI 2-3 VIEWS: CPT

## 2021-01-01 PROCEDURE — 36600 WITHDRAWAL OF ARTERIAL BLOOD: CPT | Performed by: EMERGENCY MEDICINE

## 2021-01-01 PROCEDURE — 87633 RESP VIRUS 12-25 TARGETS: CPT | Performed by: INTERNAL MEDICINE

## 2021-01-01 PROCEDURE — 85025 COMPLETE CBC W/AUTO DIFF WBC: CPT | Performed by: INTERNAL MEDICINE

## 2021-01-01 PROCEDURE — 25010000002 AMIODARONE PER 30 MG: Performed by: EMERGENCY MEDICINE

## 2021-01-01 PROCEDURE — 25010000002 METHYLPREDNISOLONE PER 40 MG: Performed by: INTERNAL MEDICINE

## 2021-01-01 PROCEDURE — 82803 BLOOD GASES ANY COMBINATION: CPT

## 2021-01-01 PROCEDURE — 25010000002 ENOXAPARIN PER 10 MG: Performed by: INTERNAL MEDICINE

## 2021-01-01 PROCEDURE — 80162 ASSAY OF DIGOXIN TOTAL: CPT | Performed by: INTERNAL MEDICINE

## 2021-01-01 PROCEDURE — 87040 BLOOD CULTURE FOR BACTERIA: CPT | Performed by: NURSE PRACTITIONER

## 2021-01-01 PROCEDURE — 25010000002 DOPAMINE PER 40 MG: Performed by: EMERGENCY MEDICINE

## 2021-01-01 PROCEDURE — C1726 CATH, BAL DIL, NON-VASCULAR: HCPCS | Performed by: INTERNAL MEDICINE

## 2021-01-01 PROCEDURE — 63710000001 INSULIN LISPRO (HUMAN) PER 5 UNITS: Performed by: INTERNAL MEDICINE

## 2021-01-01 PROCEDURE — 87899 AGENT NOS ASSAY W/OPTIC: CPT | Performed by: INTERNAL MEDICINE

## 2021-01-01 PROCEDURE — 93000 ELECTROCARDIOGRAM COMPLETE: CPT | Performed by: NURSE PRACTITIONER

## 2021-01-01 PROCEDURE — 88173 CYTOPATH EVAL FNA REPORT: CPT | Performed by: INTERNAL MEDICINE

## 2021-01-01 PROCEDURE — 25010000002 DIGOXIN PER 500 MCG: Performed by: INTERNAL MEDICINE

## 2021-01-01 PROCEDURE — 25010000002 PHENYLEPHRINE PER 1 ML: Performed by: NURSE ANESTHETIST, CERTIFIED REGISTERED

## 2021-01-01 PROCEDURE — U0004 COV-19 TEST NON-CDC HGH THRU: HCPCS | Performed by: THORACIC SURGERY (CARDIOTHORACIC VASCULAR SURGERY)

## 2021-01-01 PROCEDURE — 80048 BASIC METABOLIC PNL TOTAL CA: CPT | Performed by: INTERNAL MEDICINE

## 2021-01-01 PROCEDURE — 99231 SBSQ HOSP IP/OBS SF/LOW 25: CPT | Performed by: THORACIC SURGERY (CARDIOTHORACIC VASCULAR SURGERY)

## 2021-01-01 PROCEDURE — 81001 URINALYSIS AUTO W/SCOPE: CPT | Performed by: NURSE PRACTITIONER

## 2021-01-01 PROCEDURE — 63710000001 INSULIN GLARGINE PER 5 UNITS: Performed by: NURSE PRACTITIONER

## 2021-01-01 PROCEDURE — 93005 ELECTROCARDIOGRAM TRACING: CPT | Performed by: INTERNAL MEDICINE

## 2021-01-01 PROCEDURE — 80048 BASIC METABOLIC PNL TOTAL CA: CPT | Performed by: THORACIC SURGERY (CARDIOTHORACIC VASCULAR SURGERY)

## 2021-01-01 PROCEDURE — G0463 HOSPITAL OUTPT CLINIC VISIT: HCPCS | Performed by: THORACIC SURGERY (CARDIOTHORACIC VASCULAR SURGERY)

## 2021-01-01 PROCEDURE — 0BDC8ZX EXTRACTION OF RIGHT UPPER LUNG LOBE, VIA NATURAL OR ARTIFICIAL OPENING ENDOSCOPIC, DIAGNOSTIC: ICD-10-PCS | Performed by: INTERNAL MEDICINE

## 2021-01-01 PROCEDURE — 25010000002 EPINEPHRINE 1 MG/ML SOLUTION 30 ML VIAL: Performed by: EMERGENCY MEDICINE

## 2021-01-01 PROCEDURE — 83605 ASSAY OF LACTIC ACID: CPT | Performed by: NURSE PRACTITIONER

## 2021-01-01 PROCEDURE — 25010000002 PERFLUTREN (DEFINITY) 8.476 MG IN SODIUM CHLORIDE (PF) 0.9 % 10 ML INJECTION: Performed by: INTERNAL MEDICINE

## 2021-01-01 PROCEDURE — 63710000001 INSULIN GLARGINE PER 5 UNITS: Performed by: HOSPITALIST

## 2021-01-01 PROCEDURE — 25010000002 EPINEPHRINE 1 MG/10ML SOLUTION PREFILLED SYRINGE: Performed by: EMERGENCY MEDICINE

## 2021-01-01 PROCEDURE — 92950 HEART/LUNG RESUSCITATION CPR: CPT

## 2021-01-01 PROCEDURE — 85025 COMPLETE CBC W/AUTO DIFF WBC: CPT | Performed by: THORACIC SURGERY (CARDIOTHORACIC VASCULAR SURGERY)

## 2021-01-01 PROCEDURE — 71045 X-RAY EXAM CHEST 1 VIEW: CPT

## 2021-01-01 PROCEDURE — 99232 SBSQ HOSP IP/OBS MODERATE 35: CPT | Performed by: THORACIC SURGERY (CARDIOTHORACIC VASCULAR SURGERY)

## 2021-01-01 PROCEDURE — 88112 CYTOPATH CELL ENHANCE TECH: CPT | Performed by: INTERNAL MEDICINE

## 2021-01-01 PROCEDURE — 36600 WITHDRAWAL OF ARTERIAL BLOOD: CPT

## 2021-01-01 PROCEDURE — 83050 HGB METHEMOGLOBIN QUAN: CPT | Performed by: EMERGENCY MEDICINE

## 2021-01-01 PROCEDURE — 88312 SPECIAL STAINS GROUP 1: CPT | Performed by: INTERNAL MEDICINE

## 2021-01-01 PROCEDURE — 25010000002 ATROPINE PER 0.01 MG: Performed by: EMERGENCY MEDICINE

## 2021-01-01 PROCEDURE — 31500 INSERT EMERGENCY AIRWAY: CPT

## 2021-01-01 PROCEDURE — 80053 COMPREHEN METABOLIC PANEL: CPT | Performed by: INTERNAL MEDICINE

## 2021-01-01 PROCEDURE — 82607 VITAMIN B-12: CPT | Performed by: INTERNAL MEDICINE

## 2021-01-01 PROCEDURE — 82746 ASSAY OF FOLIC ACID SERUM: CPT | Performed by: INTERNAL MEDICINE

## 2021-01-01 PROCEDURE — 87205 SMEAR GRAM STAIN: CPT | Performed by: NURSE PRACTITIONER

## 2021-01-01 PROCEDURE — 85007 BL SMEAR W/DIFF WBC COUNT: CPT | Performed by: INTERNAL MEDICINE

## 2021-01-01 PROCEDURE — 85046 RETICYTE/HGB CONCENTRATE: CPT | Performed by: INTERNAL MEDICINE

## 2021-01-01 PROCEDURE — 87205 SMEAR GRAM STAIN: CPT | Performed by: INTERNAL MEDICINE

## 2021-01-01 PROCEDURE — 99284 EMERGENCY DEPT VISIT MOD MDM: CPT

## 2021-01-01 PROCEDURE — 71046 X-RAY EXAM CHEST 2 VIEWS: CPT

## 2021-01-01 PROCEDURE — 89051 BODY FLUID CELL COUNT: CPT | Performed by: INTERNAL MEDICINE

## 2021-01-01 PROCEDURE — 25010000002 HEPARIN LOCK FLUSH PER 10 UNITS: Performed by: INTERNAL MEDICINE

## 2021-01-01 PROCEDURE — 84100 ASSAY OF PHOSPHORUS: CPT | Performed by: INTERNAL MEDICINE

## 2021-01-01 PROCEDURE — 25010000003 ATROPINE SULFATE 1 MG/10ML SOLUTION PREFILLED SYRINGE: Performed by: EMERGENCY MEDICINE

## 2021-01-01 PROCEDURE — 07D78ZX EXTRACTION OF THORAX LYMPHATIC, VIA NATURAL OR ARTIFICIAL OPENING ENDOSCOPIC, DIAGNOSTIC: ICD-10-PCS | Performed by: INTERNAL MEDICINE

## 2021-01-01 PROCEDURE — 99233 SBSQ HOSP IP/OBS HIGH 50: CPT | Performed by: INTERNAL MEDICINE

## 2021-01-01 PROCEDURE — 88305 TISSUE EXAM BY PATHOLOGIST: CPT | Performed by: INTERNAL MEDICINE

## 2021-01-01 PROCEDURE — 87641 MR-STAPH DNA AMP PROBE: CPT | Performed by: INTERNAL MEDICINE

## 2021-01-01 PROCEDURE — 93306 TTE W/DOPPLER COMPLETE: CPT | Performed by: INTERNAL MEDICINE

## 2021-01-01 PROCEDURE — 93005 ELECTROCARDIOGRAM TRACING: CPT

## 2021-01-01 PROCEDURE — 99223 1ST HOSP IP/OBS HIGH 75: CPT | Performed by: INTERNAL MEDICINE

## 2021-01-01 PROCEDURE — 87116 MYCOBACTERIA CULTURE: CPT | Performed by: INTERNAL MEDICINE

## 2021-01-01 PROCEDURE — 25010000002 FUROSEMIDE PER 20 MG: Performed by: INTERNAL MEDICINE

## 2021-01-01 PROCEDURE — 71046 X-RAY EXAM CHEST 2 VIEWS: CPT | Performed by: RADIOLOGY

## 2021-01-01 PROCEDURE — 0B9C8ZX DRAINAGE OF RIGHT UPPER LUNG LOBE, VIA NATURAL OR ARTIFICIAL OPENING ENDOSCOPIC, DIAGNOSTIC: ICD-10-PCS | Performed by: INTERNAL MEDICINE

## 2021-01-01 PROCEDURE — 87102 FUNGUS ISOLATION CULTURE: CPT | Performed by: INTERNAL MEDICINE

## 2021-01-01 PROCEDURE — 25010000002 MORPHINE PER 10 MG: Performed by: INTERNAL MEDICINE

## 2021-01-01 PROCEDURE — 83036 HEMOGLOBIN GLYCOSYLATED A1C: CPT | Performed by: NURSE PRACTITIONER

## 2021-01-01 PROCEDURE — 81001 URINALYSIS AUTO W/SCOPE: CPT | Performed by: INTERNAL MEDICINE

## 2021-01-01 PROCEDURE — 99255 IP/OBS CONSLTJ NEW/EST HI 80: CPT | Performed by: INTERNAL MEDICINE

## 2021-01-01 PROCEDURE — 99204 OFFICE O/P NEW MOD 45 MIN: CPT | Performed by: INTERNAL MEDICINE

## 2021-01-01 PROCEDURE — 84145 PROCALCITONIN (PCT): CPT | Performed by: NURSE PRACTITIONER

## 2021-01-01 PROCEDURE — 99231 SBSQ HOSP IP/OBS SF/LOW 25: CPT | Performed by: NURSE PRACTITIONER

## 2021-01-01 PROCEDURE — 80069 RENAL FUNCTION PANEL: CPT | Performed by: INTERNAL MEDICINE

## 2021-01-01 PROCEDURE — 85610 PROTHROMBIN TIME: CPT | Performed by: THORACIC SURGERY (CARDIOTHORACIC VASCULAR SURGERY)

## 2021-01-01 PROCEDURE — 99223 1ST HOSP IP/OBS HIGH 75: CPT | Performed by: THORACIC SURGERY (CARDIOTHORACIC VASCULAR SURGERY)

## 2021-01-01 PROCEDURE — 82375 ASSAY CARBOXYHB QUANT: CPT | Performed by: EMERGENCY MEDICINE

## 2021-01-01 PROCEDURE — 99221 1ST HOSP IP/OBS SF/LOW 40: CPT | Performed by: INTERNAL MEDICINE

## 2021-01-01 PROCEDURE — 82805 BLOOD GASES W/O2 SATURATION: CPT | Performed by: EMERGENCY MEDICINE

## 2021-01-01 PROCEDURE — 94664 DEMO&/EVAL PT USE INHALER: CPT

## 2021-01-01 RX ORDER — CALCIUM CHLORIDE 100 MG/ML
INJECTION INTRAVENOUS; INTRAVENTRICULAR
Status: COMPLETED | OUTPATIENT
Start: 2021-01-01 | End: 2021-01-01

## 2021-01-01 RX ORDER — ATROPINE SULFATE 1 MG/ML
INJECTION, SOLUTION INTRAMUSCULAR; INTRAVENOUS; SUBCUTANEOUS
Status: COMPLETED | OUTPATIENT
Start: 2021-01-01 | End: 2021-01-01

## 2021-01-01 RX ORDER — HYDROCODONE BITARTRATE AND ACETAMINOPHEN 5; 325 MG/1; MG/1
1 TABLET ORAL EVERY 6 HOURS PRN
COMMUNITY

## 2021-01-01 RX ORDER — AMOXICILLIN 250 MG
2 CAPSULE ORAL 2 TIMES DAILY PRN
Start: 2021-01-01

## 2021-01-01 RX ORDER — METOPROLOL SUCCINATE 50 MG/1
50 TABLET, EXTENDED RELEASE ORAL 2 TIMES DAILY
COMMUNITY
End: 2021-01-01 | Stop reason: HOSPADM

## 2021-01-01 RX ORDER — METHYLPREDNISOLONE SODIUM SUCCINATE 40 MG/ML
40 INJECTION, POWDER, LYOPHILIZED, FOR SOLUTION INTRAMUSCULAR; INTRAVENOUS EVERY 8 HOURS
Status: DISCONTINUED | OUTPATIENT
Start: 2021-01-01 | End: 2021-01-01

## 2021-01-01 RX ORDER — HYDROCODONE BITARTRATE AND ACETAMINOPHEN 5; 325 MG/1; MG/1
1 TABLET ORAL EVERY 4 HOURS PRN
Status: DISCONTINUED | OUTPATIENT
Start: 2021-01-01 | End: 2021-01-01 | Stop reason: HOSPADM

## 2021-01-01 RX ORDER — INSULIN GLARGINE 100 [IU]/ML
15 INJECTION, SOLUTION SUBCUTANEOUS EVERY 12 HOURS SCHEDULED
Status: DISCONTINUED | OUTPATIENT
Start: 2021-01-01 | End: 2021-01-01 | Stop reason: HOSPADM

## 2021-01-01 RX ORDER — ACETAMINOPHEN 650 MG/1
650 SUPPOSITORY RECTAL EVERY 4 HOURS PRN
Status: DISCONTINUED | OUTPATIENT
Start: 2021-01-01 | End: 2021-01-01 | Stop reason: HOSPADM

## 2021-01-01 RX ORDER — BENZONATATE 100 MG/1
100 CAPSULE ORAL 3 TIMES DAILY PRN
Status: DISCONTINUED | OUTPATIENT
Start: 2021-01-01 | End: 2021-01-01

## 2021-01-01 RX ORDER — INSULIN GLARGINE 100 [IU]/ML
35 INJECTION, SOLUTION SUBCUTANEOUS NIGHTLY
Status: DISCONTINUED | OUTPATIENT
Start: 2021-01-01 | End: 2021-01-01

## 2021-01-01 RX ORDER — LIDOCAINE HYDROCHLORIDE 10 MG/ML
INJECTION, SOLUTION EPIDURAL; INFILTRATION; INTRACAUDAL; PERINEURAL AS NEEDED
Status: DISCONTINUED | OUTPATIENT
Start: 2021-01-01 | End: 2021-01-01 | Stop reason: HOSPADM

## 2021-01-01 RX ORDER — ATENOLOL 25 MG/1
25 TABLET ORAL
Status: DISCONTINUED | OUTPATIENT
Start: 2021-01-01 | End: 2021-01-01 | Stop reason: HOSPADM

## 2021-01-01 RX ORDER — IPRATROPIUM BROMIDE AND ALBUTEROL SULFATE 2.5; .5 MG/3ML; MG/3ML
3 SOLUTION RESPIRATORY (INHALATION)
Qty: 360 ML | Refills: 0 | Status: SHIPPED | OUTPATIENT
Start: 2021-01-01

## 2021-01-01 RX ORDER — PREDNISONE 20 MG/1
40 TABLET ORAL
Status: DISCONTINUED | OUTPATIENT
Start: 2021-01-01 | End: 2021-01-01

## 2021-01-01 RX ORDER — INSULIN GLARGINE 100 [IU]/ML
25 INJECTION, SOLUTION SUBCUTANEOUS NIGHTLY
Status: DISCONTINUED | OUTPATIENT
Start: 2021-01-01 | End: 2021-01-01

## 2021-01-01 RX ORDER — INSULIN LISPRO 100 [IU]/ML
10 INJECTION, SOLUTION INTRAVENOUS; SUBCUTANEOUS ONCE
Status: COMPLETED | OUTPATIENT
Start: 2021-01-01 | End: 2021-01-01

## 2021-01-01 RX ORDER — BUDESONIDE AND FORMOTEROL FUMARATE DIHYDRATE 160; 4.5 UG/1; UG/1
2 AEROSOL RESPIRATORY (INHALATION)
Status: DISCONTINUED | OUTPATIENT
Start: 2021-01-01 | End: 2021-01-01 | Stop reason: HOSPADM

## 2021-01-01 RX ORDER — DIGOXIN 0.25 MG/ML
500 INJECTION INTRAMUSCULAR; INTRAVENOUS ONCE
Status: COMPLETED | OUTPATIENT
Start: 2021-01-01 | End: 2021-01-01

## 2021-01-01 RX ORDER — FUROSEMIDE 10 MG/ML
40 INJECTION INTRAMUSCULAR; INTRAVENOUS ONCE
Status: COMPLETED | OUTPATIENT
Start: 2021-01-01 | End: 2021-01-01

## 2021-01-01 RX ORDER — INSULIN LISPRO 100 [IU]/ML
0-14 INJECTION, SOLUTION INTRAVENOUS; SUBCUTANEOUS
Status: DISCONTINUED | OUTPATIENT
Start: 2021-01-01 | End: 2021-01-01 | Stop reason: HOSPADM

## 2021-01-01 RX ORDER — SODIUM CHLORIDE 0.9 % (FLUSH) 0.9 %
20 SYRINGE (ML) INJECTION AS NEEDED
Status: DISCONTINUED | OUTPATIENT
Start: 2021-01-01 | End: 2021-01-01 | Stop reason: HOSPADM

## 2021-01-01 RX ORDER — INSULIN GLARGINE 100 [IU]/ML
25 INJECTION, SOLUTION SUBCUTANEOUS DAILY
Start: 2021-01-01

## 2021-01-01 RX ORDER — ALBUTEROL SULFATE 90 UG/1
2 AEROSOL, METERED RESPIRATORY (INHALATION) EVERY 4 HOURS PRN
COMMUNITY

## 2021-01-01 RX ORDER — INSULIN GLARGINE 100 [IU]/ML
15 INJECTION, SOLUTION SUBCUTANEOUS DAILY
Status: ON HOLD | COMMUNITY
End: 2021-01-01 | Stop reason: SDUPTHER

## 2021-01-01 RX ORDER — DOPAMINE HYDROCHLORIDE 160 MG/100ML
INJECTION, SOLUTION INTRAVENOUS
Status: COMPLETED | OUTPATIENT
Start: 2021-01-01 | End: 2021-01-01

## 2021-01-01 RX ORDER — BUDESONIDE AND FORMOTEROL FUMARATE DIHYDRATE 160; 4.5 UG/1; UG/1
2 AEROSOL RESPIRATORY (INHALATION)
Qty: 1 EACH | Refills: 0 | Status: SHIPPED | OUTPATIENT
Start: 2021-01-01

## 2021-01-01 RX ORDER — SODIUM CHLORIDE 9 MG/ML
75 INJECTION, SOLUTION INTRAVENOUS CONTINUOUS
Status: DISCONTINUED | OUTPATIENT
Start: 2021-01-01 | End: 2021-01-01

## 2021-01-01 RX ORDER — DILTIAZEM HCL IN NACL,ISO-OSM 125 MG/125
5-15 PLASTIC BAG, INJECTION (ML) INTRAVENOUS
Status: DISCONTINUED | OUTPATIENT
Start: 2021-01-01 | End: 2021-01-01

## 2021-01-01 RX ORDER — CEFAZOLIN SODIUM 2 G/100ML
2 INJECTION, SOLUTION INTRAVENOUS ONCE
Status: CANCELLED | OUTPATIENT
Start: 2021-01-01 | End: 2021-01-01

## 2021-01-01 RX ORDER — EPINEPHRINE 0.1 MG/ML
SYRINGE (ML) INJECTION
Status: COMPLETED | OUTPATIENT
Start: 2021-01-01 | End: 2021-01-01

## 2021-01-01 RX ORDER — AMIODARONE HYDROCHLORIDE 200 MG/1
400 TABLET ORAL DAILY
Status: DISCONTINUED | OUTPATIENT
Start: 2021-01-01 | End: 2021-01-01

## 2021-01-01 RX ORDER — ALBUTEROL SULFATE 2.5 MG/3ML
2.5 SOLUTION RESPIRATORY (INHALATION) EVERY 6 HOURS PRN
Status: DISCONTINUED | OUTPATIENT
Start: 2021-01-01 | End: 2021-01-01 | Stop reason: HOSPADM

## 2021-01-01 RX ORDER — INSULIN LISPRO 100 [IU]/ML
0-24 INJECTION, SOLUTION INTRAVENOUS; SUBCUTANEOUS
Status: DISCONTINUED | OUTPATIENT
Start: 2021-01-01 | End: 2021-01-01

## 2021-01-01 RX ORDER — BENZONATATE 100 MG/1
100 CAPSULE ORAL EVERY 4 HOURS PRN
Qty: 20 CAPSULE | Refills: 0 | Status: SHIPPED | OUTPATIENT
Start: 2021-01-01

## 2021-01-01 RX ORDER — HEPARIN SODIUM (PORCINE) LOCK FLUSH IV SOLN 100 UNIT/ML 100 UNIT/ML
5 SOLUTION INTRAVENOUS AS NEEDED
Status: DISCONTINUED | OUTPATIENT
Start: 2021-01-01 | End: 2021-01-01 | Stop reason: HOSPADM

## 2021-01-01 RX ORDER — LIDOCAINE HYDROCHLORIDE 20 MG/ML
INJECTION, SOLUTION EPIDURAL; INFILTRATION; INTRACAUDAL; PERINEURAL AS NEEDED
Status: DISCONTINUED | OUTPATIENT
Start: 2021-01-01 | End: 2021-01-01 | Stop reason: HOSPADM

## 2021-01-01 RX ORDER — LIDOCAINE HYDROCHLORIDE 20 MG/ML
INJECTION, SOLUTION INFILTRATION; PERINEURAL AS NEEDED
Status: DISCONTINUED | OUTPATIENT
Start: 2021-01-01 | End: 2021-01-01 | Stop reason: SURG

## 2021-01-01 RX ORDER — INSULIN LISPRO 100 [IU]/ML
9 INJECTION, SOLUTION INTRAVENOUS; SUBCUTANEOUS ONCE
Status: COMPLETED | OUTPATIENT
Start: 2021-01-01 | End: 2021-01-01

## 2021-01-01 RX ORDER — PREDNISONE 20 MG/1
20 TABLET ORAL DAILY
Qty: 3 TABLET | Refills: 0 | Status: SHIPPED | OUTPATIENT
Start: 2021-01-01 | End: 2021-01-01

## 2021-01-01 RX ORDER — BENZONATATE 100 MG/1
100 CAPSULE ORAL EVERY 4 HOURS PRN
Status: DISCONTINUED | OUTPATIENT
Start: 2021-01-01 | End: 2021-01-01 | Stop reason: HOSPADM

## 2021-01-01 RX ORDER — NALOXONE HYDROCHLORIDE 1 MG/ML
INJECTION INTRAMUSCULAR; INTRAVENOUS; SUBCUTANEOUS
Status: COMPLETED | OUTPATIENT
Start: 2021-01-01 | End: 2021-01-01

## 2021-01-01 RX ORDER — LIDOCAINE HYDROCHLORIDE 20 MG/ML
JELLY TOPICAL AS NEEDED
Status: DISCONTINUED | OUTPATIENT
Start: 2021-01-01 | End: 2021-01-01 | Stop reason: HOSPADM

## 2021-01-01 RX ORDER — PREDNISONE 10 MG/1
TABLET ORAL
Qty: 98 TABLET | Refills: 1 | OUTPATIENT
Start: 2021-01-01

## 2021-01-01 RX ORDER — NICOTINE POLACRILEX 4 MG
15 LOZENGE BUCCAL
Status: DISCONTINUED | OUTPATIENT
Start: 2021-01-01 | End: 2021-01-01 | Stop reason: HOSPADM

## 2021-01-01 RX ORDER — SODIUM CHLORIDE 0.9 % (FLUSH) 0.9 %
10 SYRINGE (ML) INJECTION AS NEEDED
Status: DISCONTINUED | OUTPATIENT
Start: 2021-01-01 | End: 2021-01-01 | Stop reason: HOSPADM

## 2021-01-01 RX ORDER — INSULIN LISPRO 100 [IU]/ML
0-9 INJECTION, SOLUTION INTRAVENOUS; SUBCUTANEOUS
Status: DISCONTINUED | OUTPATIENT
Start: 2021-01-01 | End: 2021-01-01

## 2021-01-01 RX ORDER — FUROSEMIDE 40 MG/1
40 TABLET ORAL DAILY
COMMUNITY
End: 2021-01-01 | Stop reason: HOSPADM

## 2021-01-01 RX ORDER — AMIODARONE HYDROCHLORIDE 200 MG/1
200 TABLET ORAL
Qty: 30 TABLET | Refills: 0 | Status: SHIPPED | OUTPATIENT
Start: 2021-01-01

## 2021-01-01 RX ORDER — INSULIN LISPRO 100 [IU]/ML
0-14 INJECTION, SOLUTION INTRAVENOUS; SUBCUTANEOUS
Status: DISCONTINUED | OUTPATIENT
Start: 2021-01-01 | End: 2021-01-01

## 2021-01-01 RX ORDER — INSULIN LISPRO 100 [IU]/ML
6 INJECTION, SOLUTION INTRAVENOUS; SUBCUTANEOUS
Status: DISCONTINUED | OUTPATIENT
Start: 2021-01-01 | End: 2021-01-01 | Stop reason: HOSPADM

## 2021-01-01 RX ORDER — PREDNISONE 20 MG/1
20 TABLET ORAL 2 TIMES DAILY WITH MEALS
Status: DISCONTINUED | OUTPATIENT
Start: 2021-01-01 | End: 2021-01-01 | Stop reason: HOSPADM

## 2021-01-01 RX ORDER — AMIODARONE HYDROCHLORIDE 50 MG/ML
INJECTION, SOLUTION INTRAVENOUS
Status: COMPLETED | OUTPATIENT
Start: 2021-01-01 | End: 2021-01-01

## 2021-01-01 RX ORDER — ATENOLOL 25 MG/1
25 TABLET ORAL
Qty: 30 TABLET | Refills: 0 | Status: SHIPPED | OUTPATIENT
Start: 2021-01-01

## 2021-01-01 RX ORDER — ASPIRIN 81 MG/1
81 TABLET ORAL DAILY
COMMUNITY
End: 2021-01-01 | Stop reason: HOSPADM

## 2021-01-01 RX ORDER — DIGOXIN 0.25 MG/ML
250 INJECTION INTRAMUSCULAR; INTRAVENOUS ONCE
Status: COMPLETED | OUTPATIENT
Start: 2021-01-01 | End: 2021-01-01

## 2021-01-01 RX ORDER — BISACODYL 10 MG
10 SUPPOSITORY, RECTAL RECTAL DAILY PRN
Status: DISCONTINUED | OUTPATIENT
Start: 2021-01-01 | End: 2021-01-01 | Stop reason: HOSPADM

## 2021-01-01 RX ORDER — ACETAMINOPHEN 325 MG/1
650 TABLET ORAL EVERY 6 HOURS PRN
Status: DISCONTINUED | OUTPATIENT
Start: 2021-01-01 | End: 2021-01-01 | Stop reason: HOSPADM

## 2021-01-01 RX ORDER — IPRATROPIUM BROMIDE AND ALBUTEROL SULFATE 2.5; .5 MG/3ML; MG/3ML
3 SOLUTION RESPIRATORY (INHALATION)
Status: DISCONTINUED | OUTPATIENT
Start: 2021-01-01 | End: 2021-01-01 | Stop reason: HOSPADM

## 2021-01-01 RX ORDER — HYDROCODONE BITARTRATE AND ACETAMINOPHEN 5; 325 MG/1; MG/1
1 TABLET ORAL EVERY 6 HOURS PRN
Status: DISCONTINUED | OUTPATIENT
Start: 2021-01-01 | End: 2021-01-01

## 2021-01-01 RX ORDER — AMIODARONE HYDROCHLORIDE 200 MG/1
200 TABLET ORAL
Status: DISCONTINUED | OUTPATIENT
Start: 2021-01-01 | End: 2021-01-01 | Stop reason: HOSPADM

## 2021-01-01 RX ORDER — SODIUM CHLORIDE 0.9 % (FLUSH) 0.9 %
10 SYRINGE (ML) INJECTION EVERY 12 HOURS SCHEDULED
Status: DISCONTINUED | OUTPATIENT
Start: 2021-01-01 | End: 2021-01-01

## 2021-01-01 RX ORDER — SODIUM CHLORIDE 9 MG/ML
INJECTION, SOLUTION INTRAVENOUS CONTINUOUS PRN
Status: DISCONTINUED | OUTPATIENT
Start: 2021-01-01 | End: 2021-01-01 | Stop reason: SURG

## 2021-01-01 RX ORDER — METOPROLOL SUCCINATE 50 MG/1
50 TABLET, EXTENDED RELEASE ORAL EVERY 12 HOURS SCHEDULED
Status: DISCONTINUED | OUTPATIENT
Start: 2021-01-01 | End: 2021-01-01

## 2021-01-01 RX ORDER — CALCIUM CHLORIDE 100 MG/ML
INJECTION INTRAVENOUS; INTRAVENTRICULAR
Status: DISCONTINUED
Start: 2021-01-01 | End: 2021-01-01 | Stop reason: HOSPADM

## 2021-01-01 RX ORDER — HYDROMORPHONE HYDROCHLORIDE 1 MG/ML
0.5 INJECTION, SOLUTION INTRAMUSCULAR; INTRAVENOUS; SUBCUTANEOUS EVERY 4 HOURS PRN
Status: DISCONTINUED | OUTPATIENT
Start: 2021-01-01 | End: 2021-01-01

## 2021-01-01 RX ORDER — DEXTROSE MONOHYDRATE 25 G/50ML
INJECTION, SOLUTION INTRAVENOUS
Status: COMPLETED | OUTPATIENT
Start: 2021-01-01 | End: 2021-01-01

## 2021-01-01 RX ORDER — METHYLPREDNISOLONE SODIUM SUCCINATE 40 MG/ML
40 INJECTION, POWDER, LYOPHILIZED, FOR SOLUTION INTRAMUSCULAR; INTRAVENOUS EVERY 12 HOURS
Status: DISCONTINUED | OUTPATIENT
Start: 2021-01-01 | End: 2021-01-01

## 2021-01-01 RX ORDER — MORPHINE SULFATE 2 MG/ML
1 INJECTION, SOLUTION INTRAMUSCULAR; INTRAVENOUS EVERY 4 HOURS PRN
Status: DISCONTINUED | OUTPATIENT
Start: 2021-01-01 | End: 2021-01-01

## 2021-01-01 RX ORDER — SODIUM CHLORIDE 0.9 % (FLUSH) 0.9 %
10 SYRINGE (ML) INJECTION AS NEEDED
Status: DISCONTINUED | OUTPATIENT
Start: 2021-01-01 | End: 2021-01-01

## 2021-01-01 RX ORDER — AMOXICILLIN 250 MG
2 CAPSULE ORAL 2 TIMES DAILY PRN
Status: DISCONTINUED | OUTPATIENT
Start: 2021-01-01 | End: 2021-01-01 | Stop reason: HOSPADM

## 2021-01-01 RX ORDER — INSULIN LISPRO 100 [IU]/ML
10 INJECTION, SOLUTION INTRAVENOUS; SUBCUTANEOUS
Status: DISCONTINUED | OUTPATIENT
Start: 2021-01-01 | End: 2021-01-01

## 2021-01-01 RX ORDER — INSULIN GLARGINE 100 [IU]/ML
15 INJECTION, SOLUTION SUBCUTANEOUS NIGHTLY
Status: DISCONTINUED | OUTPATIENT
Start: 2021-01-01 | End: 2021-01-01

## 2021-01-01 RX ORDER — AMIODARONE HYDROCHLORIDE 200 MG/1
400 TABLET ORAL DAILY
COMMUNITY
End: 2021-01-01 | Stop reason: HOSPADM

## 2021-01-01 RX ORDER — INSULIN GLARGINE 100 [IU]/ML
20 INJECTION, SOLUTION SUBCUTANEOUS EVERY 12 HOURS SCHEDULED
Status: DISCONTINUED | OUTPATIENT
Start: 2021-01-01 | End: 2021-01-01

## 2021-01-01 RX ORDER — DEXTROSE MONOHYDRATE 25 G/50ML
25 INJECTION, SOLUTION INTRAVENOUS
Status: DISCONTINUED | OUTPATIENT
Start: 2021-01-01 | End: 2021-01-01 | Stop reason: HOSPADM

## 2021-01-01 RX ADMIN — BUDESONIDE AND FORMOTEROL FUMARATE DIHYDRATE 2 PUFF: 160; 4.5 AEROSOL RESPIRATORY (INHALATION) at 21:12

## 2021-01-01 RX ADMIN — SODIUM CHLORIDE: 9 INJECTION, SOLUTION INTRAVENOUS at 10:04

## 2021-01-01 RX ADMIN — ATENOLOL 25 MG: 25 TABLET ORAL at 08:14

## 2021-01-01 RX ADMIN — AMIODARONE HYDROCHLORIDE 1 MG/MIN: 1.8 INJECTION, SOLUTION INTRAVENOUS at 17:13

## 2021-01-01 RX ADMIN — HYDROCODONE BITARTRATE AND HOMATROPINE METHYLBROMIDE 5 ML: 5; 1.5 SOLUTION ORAL at 23:00

## 2021-01-01 RX ADMIN — HYDROCODONE BITARTRATE AND HOMATROPINE METHYLBROMIDE 5 ML: 5; 1.5 SOLUTION ORAL at 04:10

## 2021-01-01 RX ADMIN — TAZOBACTAM SODIUM AND PIPERACILLIN SODIUM 3.38 G: 375; 3 INJECTION, SOLUTION INTRAVENOUS at 02:05

## 2021-01-01 RX ADMIN — HYDROCODONE BITARTRATE AND ACETAMINOPHEN 1 TABLET: 5; 325 TABLET ORAL at 03:36

## 2021-01-01 RX ADMIN — DEXTROSE MONOHYDRATE 25 ML: 25 INJECTION, SOLUTION INTRAVENOUS at 00:56

## 2021-01-01 RX ADMIN — IPRATROPIUM BROMIDE AND ALBUTEROL SULFATE 3 ML: 2.5; .5 SOLUTION RESPIRATORY (INHALATION) at 11:09

## 2021-01-01 RX ADMIN — EPINEPHRINE 1 MG: 0.1 INJECTION, SOLUTION ENDOTRACHEAL; INTRACARDIAC; INTRAVENOUS at 01:15

## 2021-01-01 RX ADMIN — TAZOBACTAM SODIUM AND PIPERACILLIN SODIUM 3.38 G: 375; 3 INJECTION, SOLUTION INTRAVENOUS at 07:25

## 2021-01-01 RX ADMIN — AMIODARONE HYDROCHLORIDE 300 MG: 50 INJECTION, SOLUTION INTRAVENOUS at 00:50

## 2021-01-01 RX ADMIN — SODIUM CHLORIDE 75 ML/HR: 9 INJECTION, SOLUTION INTRAVENOUS at 17:05

## 2021-01-01 RX ADMIN — HEPARIN 500 UNITS: 100 SYRINGE at 13:11

## 2021-01-01 RX ADMIN — FUROSEMIDE 40 MG: 10 INJECTION, SOLUTION INTRAMUSCULAR; INTRAVENOUS at 10:01

## 2021-01-01 RX ADMIN — EPINEPHRINE 1 MG: 0.1 INJECTION, SOLUTION ENDOTRACHEAL; INTRACARDIAC; INTRAVENOUS at 00:49

## 2021-01-01 RX ADMIN — EPINEPHRINE 1 MG: 0.1 INJECTION, SOLUTION ENDOTRACHEAL; INTRACARDIAC; INTRAVENOUS at 00:47

## 2021-01-01 RX ADMIN — SODIUM CHLORIDE 75 ML/HR: 9 INJECTION, SOLUTION INTRAVENOUS at 03:12

## 2021-01-01 RX ADMIN — IPRATROPIUM BROMIDE AND ALBUTEROL SULFATE 3 ML: 2.5; .5 SOLUTION RESPIRATORY (INHALATION) at 15:29

## 2021-01-01 RX ADMIN — EPINEPHRINE 1 MG: 0.1 INJECTION, SOLUTION ENDOTRACHEAL; INTRACARDIAC; INTRAVENOUS at 00:51

## 2021-01-01 RX ADMIN — TAZOBACTAM SODIUM AND PIPERACILLIN SODIUM 3.38 G: 375; 3 INJECTION, SOLUTION INTRAVENOUS at 17:01

## 2021-01-01 RX ADMIN — SODIUM CHLORIDE, PRESERVATIVE FREE 10 ML: 5 INJECTION INTRAVENOUS at 08:10

## 2021-01-01 RX ADMIN — TAZOBACTAM SODIUM AND PIPERACILLIN SODIUM 3.38 G: 375; 3 INJECTION, SOLUTION INTRAVENOUS at 23:00

## 2021-01-01 RX ADMIN — EPINEPHRINE 1 MG: 0.1 INJECTION, SOLUTION ENDOTRACHEAL; INTRACARDIAC; INTRAVENOUS at 00:56

## 2021-01-01 RX ADMIN — ATROPINE SULFATE 1 MG: 1 INJECTION, SOLUTION INTRAMUSCULAR; INTRAVENOUS; SUBCUTANEOUS at 01:27

## 2021-01-01 RX ADMIN — HYDROCODONE BITARTRATE AND HOMATROPINE METHYLBROMIDE 5 ML: 5; 1.5 SOLUTION ORAL at 16:12

## 2021-01-01 RX ADMIN — BENZONATATE 100 MG: 100 CAPSULE ORAL at 20:53

## 2021-01-01 RX ADMIN — INSULIN GLARGINE 20 UNITS: 100 INJECTION, SOLUTION SUBCUTANEOUS at 22:41

## 2021-01-01 RX ADMIN — SODIUM CHLORIDE, PRESERVATIVE FREE 20 ML: 5 INJECTION INTRAVENOUS at 08:11

## 2021-01-01 RX ADMIN — DIGOXIN 500 MCG: 0.25 INJECTION INTRAMUSCULAR; INTRAVENOUS at 16:58

## 2021-01-01 RX ADMIN — AMIODARONE HYDROCHLORIDE 200 MG: 200 TABLET ORAL at 09:41

## 2021-01-01 RX ADMIN — INSULIN LISPRO 3 UNITS: 100 INJECTION, SOLUTION INTRAVENOUS; SUBCUTANEOUS at 16:58

## 2021-01-01 RX ADMIN — INSULIN LISPRO 14 UNITS: 100 INJECTION, SOLUTION INTRAVENOUS; SUBCUTANEOUS at 12:08

## 2021-01-01 RX ADMIN — BUDESONIDE AND FORMOTEROL FUMARATE DIHYDRATE 2 PUFF: 160; 4.5 AEROSOL RESPIRATORY (INHALATION) at 07:25

## 2021-01-01 RX ADMIN — ATENOLOL 25 MG: 25 TABLET ORAL at 08:09

## 2021-01-01 RX ADMIN — SODIUM BICARBONATE 50 MEQ: 84 INJECTION, SOLUTION INTRAVENOUS at 01:00

## 2021-01-01 RX ADMIN — INSULIN LISPRO 10 UNITS: 100 INJECTION, SOLUTION INTRAVENOUS; SUBCUTANEOUS at 12:05

## 2021-01-01 RX ADMIN — BENZONATATE 100 MG: 100 CAPSULE ORAL at 23:36

## 2021-01-01 RX ADMIN — TAZOBACTAM SODIUM AND PIPERACILLIN SODIUM 3.38 G: 375; 3 INJECTION, SOLUTION INTRAVENOUS at 15:48

## 2021-01-01 RX ADMIN — EPINEPHRINE 1 MG: 0.1 INJECTION, SOLUTION ENDOTRACHEAL; INTRACARDIAC; INTRAVENOUS at 01:33

## 2021-01-01 RX ADMIN — TAZOBACTAM SODIUM AND PIPERACILLIN SODIUM 3.38 G: 375; 3 INJECTION, SOLUTION INTRAVENOUS at 15:23

## 2021-01-01 RX ADMIN — DOPAMINE HYDROCHLORIDE 5 MCG/KG/MIN: 160 INJECTION, SOLUTION INTRAVENOUS at 01:24

## 2021-01-01 RX ADMIN — ENOXAPARIN SODIUM 30 MG: 30 INJECTION SUBCUTANEOUS at 12:01

## 2021-01-01 RX ADMIN — TAZOBACTAM SODIUM AND PIPERACILLIN SODIUM 3.38 G: 375; 3 INJECTION, SOLUTION INTRAVENOUS at 00:14

## 2021-01-01 RX ADMIN — HYDROCODONE BITARTRATE AND HOMATROPINE METHYLBROMIDE 5 ML: 5; 1.5 SOLUTION ORAL at 21:50

## 2021-01-01 RX ADMIN — ATENOLOL 25 MG: 25 TABLET ORAL at 09:41

## 2021-01-01 RX ADMIN — EPINEPHRINE 1 MG: 0.1 INJECTION, SOLUTION ENDOTRACHEAL; INTRACARDIAC; INTRAVENOUS at 00:42

## 2021-01-01 RX ADMIN — BENZONATATE 100 MG: 100 CAPSULE ORAL at 00:48

## 2021-01-01 RX ADMIN — IPRATROPIUM BROMIDE AND ALBUTEROL SULFATE 3 ML: 2.5; .5 SOLUTION RESPIRATORY (INHALATION) at 20:02

## 2021-01-01 RX ADMIN — BUDESONIDE AND FORMOTEROL FUMARATE DIHYDRATE 2 PUFF: 160; 4.5 AEROSOL RESPIRATORY (INHALATION) at 19:55

## 2021-01-01 RX ADMIN — ALBUTEROL SULFATE 2.5 MG: 2.5 SOLUTION RESPIRATORY (INHALATION) at 03:11

## 2021-01-01 RX ADMIN — HYDROCODONE BITARTRATE AND HOMATROPINE METHYLBROMIDE 5 ML: 5; 1.5 SOLUTION ORAL at 00:40

## 2021-01-01 RX ADMIN — SODIUM CHLORIDE, PRESERVATIVE FREE 10 ML: 5 INJECTION INTRAVENOUS at 20:32

## 2021-01-01 RX ADMIN — METHYLPREDNISOLONE SODIUM SUCCINATE 40 MG: 40 INJECTION, POWDER, FOR SOLUTION INTRAMUSCULAR; INTRAVENOUS at 20:29

## 2021-01-01 RX ADMIN — ATROPINE SULFATE 0.5 MG: 1 INJECTION, SOLUTION INTRAMUSCULAR; INTRAVENOUS; SUBCUTANEOUS at 01:14

## 2021-01-01 RX ADMIN — SODIUM BICARBONATE 50 MEQ: 84 INJECTION, SOLUTION INTRAVENOUS at 00:47

## 2021-01-01 RX ADMIN — ALBUTEROL SULFATE 2.5 MG: 2.5 SOLUTION RESPIRATORY (INHALATION) at 04:47

## 2021-01-01 RX ADMIN — TAZOBACTAM SODIUM AND PIPERACILLIN SODIUM 3.38 G: 375; 3 INJECTION, SOLUTION INTRAVENOUS at 15:34

## 2021-01-01 RX ADMIN — HYDROCODONE BITARTRATE AND HOMATROPINE METHYLBROMIDE 5 ML: 5; 1.5 SOLUTION ORAL at 01:19

## 2021-01-01 RX ADMIN — INSULIN GLARGINE 25 UNITS: 100 INJECTION, SOLUTION SUBCUTANEOUS at 20:29

## 2021-01-01 RX ADMIN — TAZOBACTAM SODIUM AND PIPERACILLIN SODIUM 3.38 G: 375; 3 INJECTION, SOLUTION INTRAVENOUS at 15:26

## 2021-01-01 RX ADMIN — BENZONATATE 100 MG: 100 CAPSULE ORAL at 13:09

## 2021-01-01 RX ADMIN — BENZONATATE 100 MG: 100 CAPSULE ORAL at 17:40

## 2021-01-01 RX ADMIN — BENZONATATE 100 MG: 100 CAPSULE ORAL at 08:11

## 2021-01-01 RX ADMIN — DIGOXIN 250 MCG: 0.25 INJECTION INTRAMUSCULAR; INTRAVENOUS at 23:14

## 2021-01-01 RX ADMIN — INSULIN LISPRO 10 UNITS: 100 INJECTION, SOLUTION INTRAVENOUS; SUBCUTANEOUS at 09:42

## 2021-01-01 RX ADMIN — INSULIN LISPRO 5 UNITS: 100 INJECTION, SOLUTION INTRAVENOUS; SUBCUTANEOUS at 20:39

## 2021-01-01 RX ADMIN — BUDESONIDE AND FORMOTEROL FUMARATE DIHYDRATE 2 PUFF: 160; 4.5 AEROSOL RESPIRATORY (INHALATION) at 07:39

## 2021-01-01 RX ADMIN — TAZOBACTAM SODIUM AND PIPERACILLIN SODIUM 3.38 G: 375; 3 INJECTION, SOLUTION INTRAVENOUS at 15:15

## 2021-01-01 RX ADMIN — EPINEPHRINE 1 MG: 0.1 INJECTION, SOLUTION ENDOTRACHEAL; INTRACARDIAC; INTRAVENOUS at 00:55

## 2021-01-01 RX ADMIN — IPRATROPIUM BROMIDE AND ALBUTEROL SULFATE 3 ML: 2.5; .5 SOLUTION RESPIRATORY (INHALATION) at 17:03

## 2021-01-01 RX ADMIN — AMIODARONE HYDROCHLORIDE 1 MG/MIN: 1.8 INJECTION, SOLUTION INTRAVENOUS at 22:15

## 2021-01-01 RX ADMIN — INSULIN LISPRO 8 UNITS: 100 INJECTION, SOLUTION INTRAVENOUS; SUBCUTANEOUS at 10:15

## 2021-01-01 RX ADMIN — IPRATROPIUM BROMIDE AND ALBUTEROL SULFATE 3 ML: 2.5; .5 SOLUTION RESPIRATORY (INHALATION) at 15:09

## 2021-01-01 RX ADMIN — IPRATROPIUM BROMIDE AND ALBUTEROL SULFATE 3 ML: 2.5; .5 SOLUTION RESPIRATORY (INHALATION) at 19:26

## 2021-01-01 RX ADMIN — ACETAMINOPHEN 650 MG: 325 TABLET, FILM COATED ORAL at 06:19

## 2021-01-01 RX ADMIN — HYDROCODONE BITARTRATE AND ACETAMINOPHEN 1 TABLET: 5; 325 TABLET ORAL at 14:48

## 2021-01-01 RX ADMIN — PHENYLEPHRINE HYDROCHLORIDE 200 MCG: 10 INJECTION INTRAVENOUS at 10:34

## 2021-01-01 RX ADMIN — SODIUM CHLORIDE, PRESERVATIVE FREE 10 ML: 5 INJECTION INTRAVENOUS at 20:34

## 2021-01-01 RX ADMIN — BUDESONIDE AND FORMOTEROL FUMARATE DIHYDRATE 2 PUFF: 160; 4.5 AEROSOL RESPIRATORY (INHALATION) at 19:35

## 2021-01-01 RX ADMIN — TAZOBACTAM SODIUM AND PIPERACILLIN SODIUM 3.38 G: 375; 3 INJECTION, SOLUTION INTRAVENOUS at 08:54

## 2021-01-01 RX ADMIN — IPRATROPIUM BROMIDE AND ALBUTEROL SULFATE 3 ML: 2.5; .5 SOLUTION RESPIRATORY (INHALATION) at 10:54

## 2021-01-01 RX ADMIN — INSULIN LISPRO 6 UNITS: 100 INJECTION, SOLUTION INTRAVENOUS; SUBCUTANEOUS at 17:14

## 2021-01-01 RX ADMIN — AMIODARONE HYDROCHLORIDE 0.5 MG/MIN: 1.8 INJECTION, SOLUTION INTRAVENOUS at 08:29

## 2021-01-01 RX ADMIN — ACETAMINOPHEN 325 MG: 325 TABLET, FILM COATED ORAL at 20:36

## 2021-01-01 RX ADMIN — IPRATROPIUM BROMIDE AND ALBUTEROL SULFATE 3 ML: 2.5; .5 SOLUTION RESPIRATORY (INHALATION) at 08:36

## 2021-01-01 RX ADMIN — IPRATROPIUM BROMIDE AND ALBUTEROL SULFATE 3 ML: 2.5; .5 SOLUTION RESPIRATORY (INHALATION) at 11:10

## 2021-01-01 RX ADMIN — HYDROCODONE BITARTRATE AND HOMATROPINE METHYLBROMIDE 5 ML: 5; 1.5 SOLUTION ORAL at 12:40

## 2021-01-01 RX ADMIN — EPINEPHRINE 1 MG: 0.1 INJECTION, SOLUTION ENDOTRACHEAL; INTRACARDIAC; INTRAVENOUS at 00:44

## 2021-01-01 RX ADMIN — AMIODARONE HYDROCHLORIDE 200 MG: 200 TABLET ORAL at 08:54

## 2021-01-01 RX ADMIN — INSULIN GLARGINE 25 UNITS: 100 INJECTION, SOLUTION SUBCUTANEOUS at 21:36

## 2021-01-01 RX ADMIN — ATENOLOL 25 MG: 25 TABLET ORAL at 08:10

## 2021-01-01 RX ADMIN — INSULIN LISPRO 6 UNITS: 100 INJECTION, SOLUTION INTRAVENOUS; SUBCUTANEOUS at 12:01

## 2021-01-01 RX ADMIN — BENZONATATE 100 MG: 100 CAPSULE ORAL at 05:36

## 2021-01-01 RX ADMIN — SODIUM CHLORIDE 1000 ML: 9 INJECTION, SOLUTION INTRAVENOUS at 01:17

## 2021-01-01 RX ADMIN — ATROPINE SULFATE 0.5 MG: 1 INJECTION, SOLUTION INTRAMUSCULAR; INTRAVENOUS; SUBCUTANEOUS at 01:12

## 2021-01-01 RX ADMIN — METOPROLOL SUCCINATE 50 MG: 50 TABLET, EXTENDED RELEASE ORAL at 21:18

## 2021-01-01 RX ADMIN — INSULIN LISPRO 14 UNITS: 100 INJECTION, SOLUTION INTRAVENOUS; SUBCUTANEOUS at 12:00

## 2021-01-01 RX ADMIN — HYDROCODONE BITARTRATE AND HOMATROPINE METHYLBROMIDE 5 ML: 5; 1.5 SOLUTION ORAL at 07:36

## 2021-01-01 RX ADMIN — AMIODARONE HYDROCHLORIDE 0.5 MG/MIN: 1.8 INJECTION, SOLUTION INTRAVENOUS at 21:44

## 2021-01-01 RX ADMIN — TAZOBACTAM SODIUM AND PIPERACILLIN SODIUM 3.38 G: 375; 3 INJECTION, SOLUTION INTRAVENOUS at 11:18

## 2021-01-01 RX ADMIN — SODIUM CHLORIDE, PRESERVATIVE FREE 10 ML: 5 INJECTION INTRAVENOUS at 08:54

## 2021-01-01 RX ADMIN — HYDROCODONE BITARTRATE AND ACETAMINOPHEN 1 TABLET: 5; 325 TABLET ORAL at 10:21

## 2021-01-01 RX ADMIN — ACETAMINOPHEN 650 MG: 325 TABLET, FILM COATED ORAL at 22:15

## 2021-01-01 RX ADMIN — AMIODARONE HYDROCHLORIDE 200 MG: 200 TABLET ORAL at 16:12

## 2021-01-01 RX ADMIN — TAZOBACTAM SODIUM AND PIPERACILLIN SODIUM 3.38 G: 375; 3 INJECTION, SOLUTION INTRAVENOUS at 08:10

## 2021-01-01 RX ADMIN — INSULIN LISPRO 9 UNITS: 100 INJECTION, SOLUTION INTRAVENOUS; SUBCUTANEOUS at 03:12

## 2021-01-01 RX ADMIN — HYDROCODONE BITARTRATE AND ACETAMINOPHEN 1 TABLET: 5; 325 TABLET ORAL at 20:36

## 2021-01-01 RX ADMIN — INSULIN LISPRO 3 UNITS: 100 INJECTION, SOLUTION INTRAVENOUS; SUBCUTANEOUS at 08:10

## 2021-01-01 RX ADMIN — EPINEPHRINE 1 MG: 0.1 INJECTION, SOLUTION ENDOTRACHEAL; INTRACARDIAC; INTRAVENOUS at 01:19

## 2021-01-01 RX ADMIN — INSULIN GLARGINE 15 UNITS: 100 INJECTION, SOLUTION SUBCUTANEOUS at 21:19

## 2021-01-01 RX ADMIN — PREDNISONE 20 MG: 20 TABLET ORAL at 09:42

## 2021-01-01 RX ADMIN — NALOXONE HYDROCHLORIDE 2 MG: 1 INJECTION PARENTERAL at 00:56

## 2021-01-01 RX ADMIN — PHENYLEPHRINE HYDROCHLORIDE 100 MCG: 10 INJECTION INTRAVENOUS at 10:24

## 2021-01-01 RX ADMIN — BUDESONIDE AND FORMOTEROL FUMARATE DIHYDRATE 2 PUFF: 160; 4.5 AEROSOL RESPIRATORY (INHALATION) at 07:38

## 2021-01-01 RX ADMIN — PROPOFOL 180 MCG/KG/MIN: 10 INJECTION, EMULSION INTRAVENOUS at 10:20

## 2021-01-01 RX ADMIN — EPINEPHRINE 1 MG: 0.1 INJECTION, SOLUTION ENDOTRACHEAL; INTRACARDIAC; INTRAVENOUS at 01:42

## 2021-01-01 RX ADMIN — CALCIUM CHLORIDE 1 G: 100 INJECTION INTRAVENOUS; INTRAVENTRICULAR at 00:46

## 2021-01-01 RX ADMIN — HYDROCODONE BITARTRATE AND HOMATROPINE METHYLBROMIDE 5 ML: 5; 1.5 SOLUTION ORAL at 04:29

## 2021-01-01 RX ADMIN — INSULIN LISPRO 8 UNITS: 100 INJECTION, SOLUTION INTRAVENOUS; SUBCUTANEOUS at 16:54

## 2021-01-01 RX ADMIN — BUDESONIDE AND FORMOTEROL FUMARATE DIHYDRATE 2 PUFF: 160; 4.5 AEROSOL RESPIRATORY (INHALATION) at 19:47

## 2021-01-01 RX ADMIN — METHYLPREDNISOLONE SODIUM SUCCINATE 40 MG: 40 INJECTION, POWDER, FOR SOLUTION INTRAMUSCULAR; INTRAVENOUS at 11:35

## 2021-01-01 RX ADMIN — TAZOBACTAM SODIUM AND PIPERACILLIN SODIUM 3.38 G: 375; 3 INJECTION, SOLUTION INTRAVENOUS at 22:41

## 2021-01-01 RX ADMIN — AMIODARONE HYDROCHLORIDE 0.5 MG/MIN: 1.8 INJECTION, SOLUTION INTRAVENOUS at 09:42

## 2021-01-01 RX ADMIN — INSULIN LISPRO 7 UNITS: 100 INJECTION, SOLUTION INTRAVENOUS; SUBCUTANEOUS at 08:08

## 2021-01-01 RX ADMIN — EPINEPHRINE 0.05 MCG/KG/MIN: 1 INJECTION PARENTERAL at 01:15

## 2021-01-01 RX ADMIN — PREDNISONE 20 MG: 20 TABLET ORAL at 08:09

## 2021-01-01 RX ADMIN — INSULIN GLARGINE 15 UNITS: 100 INJECTION, SOLUTION SUBCUTANEOUS at 08:12

## 2021-01-01 RX ADMIN — ATROPINE SULFATE 1 MG: 1 INJECTION, SOLUTION INTRAMUSCULAR; INTRAVENOUS; SUBCUTANEOUS at 01:22

## 2021-01-01 RX ADMIN — INSULIN LISPRO 3 UNITS: 100 INJECTION, SOLUTION INTRAVENOUS; SUBCUTANEOUS at 17:38

## 2021-01-01 RX ADMIN — ATENOLOL 25 MG: 25 TABLET ORAL at 08:17

## 2021-01-01 RX ADMIN — IPRATROPIUM BROMIDE AND ALBUTEROL SULFATE 3 ML: 2.5; .5 SOLUTION RESPIRATORY (INHALATION) at 15:34

## 2021-01-01 RX ADMIN — INSULIN LISPRO 10 UNITS: 100 INJECTION, SOLUTION INTRAVENOUS; SUBCUTANEOUS at 08:10

## 2021-01-01 RX ADMIN — IPRATROPIUM BROMIDE AND ALBUTEROL SULFATE 3 ML: 2.5; .5 SOLUTION RESPIRATORY (INHALATION) at 16:04

## 2021-01-01 RX ADMIN — INSULIN LISPRO 8 UNITS: 100 INJECTION, SOLUTION INTRAVENOUS; SUBCUTANEOUS at 11:35

## 2021-01-01 RX ADMIN — AMIODARONE HYDROCHLORIDE 0.5 MG/MIN: 1.8 INJECTION, SOLUTION INTRAVENOUS at 08:01

## 2021-01-01 RX ADMIN — INSULIN LISPRO 10 UNITS: 100 INJECTION, SOLUTION INTRAVENOUS; SUBCUTANEOUS at 12:08

## 2021-01-01 RX ADMIN — BENZONATATE 100 MG: 100 CAPSULE ORAL at 23:49

## 2021-01-01 RX ADMIN — TAZOBACTAM SODIUM AND PIPERACILLIN SODIUM 3.38 G: 375; 3 INJECTION, SOLUTION INTRAVENOUS at 23:49

## 2021-01-01 RX ADMIN — METHYLPREDNISOLONE SODIUM SUCCINATE 40 MG: 40 INJECTION, POWDER, FOR SOLUTION INTRAMUSCULAR; INTRAVENOUS at 21:36

## 2021-01-01 RX ADMIN — METHYLPREDNISOLONE SODIUM SUCCINATE 40 MG: 40 INJECTION, POWDER, FOR SOLUTION INTRAMUSCULAR; INTRAVENOUS at 08:18

## 2021-01-01 RX ADMIN — PHENYLEPHRINE HYDROCHLORIDE 200 MCG: 10 INJECTION INTRAVENOUS at 10:28

## 2021-01-01 RX ADMIN — TAZOBACTAM SODIUM AND PIPERACILLIN SODIUM 3.38 G: 375; 3 INJECTION, SOLUTION INTRAVENOUS at 08:30

## 2021-01-01 RX ADMIN — IPRATROPIUM BROMIDE AND ALBUTEROL SULFATE 3 ML: 2.5; .5 SOLUTION RESPIRATORY (INHALATION) at 15:18

## 2021-01-01 RX ADMIN — INSULIN LISPRO 5 UNITS: 100 INJECTION, SOLUTION INTRAVENOUS; SUBCUTANEOUS at 12:37

## 2021-01-01 RX ADMIN — BENZONATATE 100 MG: 100 CAPSULE ORAL at 01:23

## 2021-01-01 RX ADMIN — AMIODARONE HYDROCHLORIDE 0.5 MG/MIN: 1.8 INJECTION, SOLUTION INTRAVENOUS at 20:31

## 2021-01-01 RX ADMIN — SODIUM BICARBONATE 50 MEQ: 84 INJECTION, SOLUTION INTRAVENOUS at 01:16

## 2021-01-01 RX ADMIN — BUDESONIDE AND FORMOTEROL FUMARATE DIHYDRATE 2 PUFF: 160; 4.5 AEROSOL RESPIRATORY (INHALATION) at 07:41

## 2021-01-01 RX ADMIN — TAZOBACTAM SODIUM AND PIPERACILLIN SODIUM 3.38 G: 375; 3 INJECTION, SOLUTION INTRAVENOUS at 06:37

## 2021-01-01 RX ADMIN — SODIUM CHLORIDE, PRESERVATIVE FREE 10 ML: 5 INJECTION INTRAVENOUS at 21:36

## 2021-01-01 RX ADMIN — INSULIN LISPRO 8 UNITS: 100 INJECTION, SOLUTION INTRAVENOUS; SUBCUTANEOUS at 13:35

## 2021-01-01 RX ADMIN — PHENYLEPHRINE HYDROCHLORIDE 100 MCG: 10 INJECTION INTRAVENOUS at 10:57

## 2021-01-01 RX ADMIN — CALCIUM CHLORIDE 1 G: 100 INJECTION INTRAVENOUS; INTRAVENTRICULAR at 01:26

## 2021-01-01 RX ADMIN — AMIODARONE HYDROCHLORIDE 200 MG: 200 TABLET ORAL at 08:18

## 2021-01-01 RX ADMIN — PERFLUTREN 3 ML: 6.52 INJECTION, SUSPENSION INTRAVENOUS at 13:29

## 2021-01-01 RX ADMIN — SODIUM BICARBONATE 50 MEQ: 84 INJECTION, SOLUTION INTRAVENOUS at 01:31

## 2021-01-01 RX ADMIN — ENOXAPARIN SODIUM 30 MG: 30 INJECTION SUBCUTANEOUS at 12:36

## 2021-01-01 RX ADMIN — INSULIN LISPRO 24 UNITS: 100 INJECTION, SOLUTION INTRAVENOUS; SUBCUTANEOUS at 08:18

## 2021-01-01 RX ADMIN — EPINEPHRINE 1 MG: 0.1 INJECTION, SOLUTION ENDOTRACHEAL; INTRACARDIAC; INTRAVENOUS at 00:59

## 2021-01-01 RX ADMIN — SODIUM CHLORIDE 1000 ML: 9 INJECTION, SOLUTION INTRAVENOUS at 01:18

## 2021-01-01 RX ADMIN — SODIUM CHLORIDE 75 ML/HR: 9 INJECTION, SOLUTION INTRAVENOUS at 20:07

## 2021-01-01 RX ADMIN — HYDROCODONE BITARTRATE AND ACETAMINOPHEN 1 TABLET: 5; 325 TABLET ORAL at 11:18

## 2021-01-01 RX ADMIN — EPINEPHRINE 1 MG: 0.1 INJECTION, SOLUTION ENDOTRACHEAL; INTRACARDIAC; INTRAVENOUS at 01:14

## 2021-01-01 RX ADMIN — EPINEPHRINE 1 MG: 0.1 INJECTION, SOLUTION ENDOTRACHEAL; INTRACARDIAC; INTRAVENOUS at 00:53

## 2021-01-01 RX ADMIN — IPRATROPIUM BROMIDE AND ALBUTEROL SULFATE 3 ML: 2.5; .5 SOLUTION RESPIRATORY (INHALATION) at 07:07

## 2021-01-01 RX ADMIN — INSULIN LISPRO 10 UNITS: 100 INJECTION, SOLUTION INTRAVENOUS; SUBCUTANEOUS at 09:44

## 2021-01-01 RX ADMIN — ENOXAPARIN SODIUM 30 MG: 30 INJECTION SUBCUTANEOUS at 12:08

## 2021-01-01 RX ADMIN — IPRATROPIUM BROMIDE AND ALBUTEROL SULFATE 3 ML: 2.5; .5 SOLUTION RESPIRATORY (INHALATION) at 10:52

## 2021-01-01 RX ADMIN — INSULIN LISPRO 16 UNITS: 100 INJECTION, SOLUTION INTRAVENOUS; SUBCUTANEOUS at 17:06

## 2021-01-01 RX ADMIN — LIDOCAINE HYDROCHLORIDE 50 MG: 20 INJECTION, SOLUTION INFILTRATION; PERINEURAL at 10:20

## 2021-01-01 RX ADMIN — INSULIN LISPRO 12 UNITS: 100 INJECTION, SOLUTION INTRAVENOUS; SUBCUTANEOUS at 12:36

## 2021-01-01 RX ADMIN — ATENOLOL 25 MG: 25 TABLET ORAL at 14:44

## 2021-01-01 RX ADMIN — EPINEPHRINE 1 MG: 0.1 INJECTION, SOLUTION ENDOTRACHEAL; INTRACARDIAC; INTRAVENOUS at 01:39

## 2021-01-01 RX ADMIN — BUDESONIDE AND FORMOTEROL FUMARATE DIHYDRATE 2 PUFF: 160; 4.5 AEROSOL RESPIRATORY (INHALATION) at 08:31

## 2021-01-01 RX ADMIN — TAZOBACTAM SODIUM AND PIPERACILLIN SODIUM 3.38 G: 375; 3 INJECTION, SOLUTION INTRAVENOUS at 22:26

## 2021-01-01 RX ADMIN — ATENOLOL 25 MG: 25 TABLET ORAL at 08:54

## 2021-01-01 RX ADMIN — INSULIN GLARGINE 15 UNITS: 100 INJECTION, SOLUTION SUBCUTANEOUS at 20:30

## 2021-01-01 RX ADMIN — INSULIN GLARGINE 20 UNITS: 100 INJECTION, SOLUTION SUBCUTANEOUS at 09:44

## 2021-01-01 RX ADMIN — AMIODARONE HYDROCHLORIDE 200 MG: 200 TABLET ORAL at 08:09

## 2021-01-01 RX ADMIN — SODIUM BICARBONATE 50 MEQ: 84 INJECTION, SOLUTION INTRAVENOUS at 00:45

## 2021-01-01 RX ADMIN — IPRATROPIUM BROMIDE AND ALBUTEROL SULFATE 3 ML: 2.5; .5 SOLUTION RESPIRATORY (INHALATION) at 11:52

## 2021-01-01 RX ADMIN — METHYLPREDNISOLONE SODIUM SUCCINATE 40 MG: 40 INJECTION, POWDER, FOR SOLUTION INTRAMUSCULAR; INTRAVENOUS at 04:29

## 2021-01-01 RX ADMIN — IPRATROPIUM BROMIDE AND ALBUTEROL SULFATE 3 ML: 2.5; .5 SOLUTION RESPIRATORY (INHALATION) at 11:22

## 2021-01-01 RX ADMIN — IPRATROPIUM BROMIDE AND ALBUTEROL SULFATE 3 ML: 2.5; .5 SOLUTION RESPIRATORY (INHALATION) at 20:06

## 2021-01-01 RX ADMIN — PREDNISONE 20 MG: 20 TABLET ORAL at 17:14

## 2021-01-01 RX ADMIN — INSULIN LISPRO 14 UNITS: 100 INJECTION, SOLUTION INTRAVENOUS; SUBCUTANEOUS at 11:49

## 2021-01-01 RX ADMIN — MORPHINE SULFATE 1 MG: 2 INJECTION, SOLUTION INTRAMUSCULAR; INTRAVENOUS at 11:10

## 2021-01-01 RX ADMIN — BUDESONIDE AND FORMOTEROL FUMARATE DIHYDRATE 2 PUFF: 160; 4.5 AEROSOL RESPIRATORY (INHALATION) at 20:02

## 2021-01-01 RX ADMIN — INSULIN LISPRO 10 UNITS: 100 INJECTION, SOLUTION INTRAVENOUS; SUBCUTANEOUS at 12:35

## 2021-01-01 RX ADMIN — AMIODARONE HYDROCHLORIDE 0.5 MG/MIN: 1.8 INJECTION, SOLUTION INTRAVENOUS at 17:54

## 2021-01-01 RX ADMIN — INSULIN GLARGINE 15 UNITS: 100 INJECTION, SOLUTION SUBCUTANEOUS at 20:53

## 2021-01-01 RX ADMIN — INSULIN GLARGINE 15 UNITS: 100 INJECTION, SOLUTION SUBCUTANEOUS at 20:39

## 2021-01-01 RX ADMIN — IPRATROPIUM BROMIDE AND ALBUTEROL SULFATE 3 ML: 2.5; .5 SOLUTION RESPIRATORY (INHALATION) at 16:16

## 2021-01-01 RX ADMIN — ATROPINE SULFATE 1 MG: 1 INJECTION, SOLUTION INTRAMUSCULAR; INTRAVENOUS; SUBCUTANEOUS at 00:59

## 2021-01-01 RX ADMIN — IPRATROPIUM BROMIDE AND ALBUTEROL SULFATE 3 ML: 2.5; .5 SOLUTION RESPIRATORY (INHALATION) at 07:49

## 2021-01-01 RX ADMIN — SODIUM CHLORIDE, PRESERVATIVE FREE 10 ML: 5 INJECTION INTRAVENOUS at 08:25

## 2021-01-01 RX ADMIN — INSULIN LISPRO 8 UNITS: 100 INJECTION, SOLUTION INTRAVENOUS; SUBCUTANEOUS at 09:42

## 2021-01-01 RX ADMIN — BENZONATATE 100 MG: 100 CAPSULE ORAL at 15:52

## 2021-01-01 RX ADMIN — BENZONATATE 100 MG: 100 CAPSULE ORAL at 20:34

## 2021-01-01 RX ADMIN — INSULIN LISPRO 5 UNITS: 100 INJECTION, SOLUTION INTRAVENOUS; SUBCUTANEOUS at 07:47

## 2021-01-01 RX ADMIN — METOROPROLOL TARTRATE 2.5 MG: 5 INJECTION, SOLUTION INTRAVENOUS at 08:33

## 2021-01-01 RX ADMIN — TAZOBACTAM SODIUM AND PIPERACILLIN SODIUM 3.38 G: 375; 3 INJECTION, SOLUTION INTRAVENOUS at 16:09

## 2021-01-01 RX ADMIN — METOROPROLOL TARTRATE 5 MG: 5 INJECTION, SOLUTION INTRAVENOUS at 14:54

## 2021-01-01 RX ADMIN — ACETAMINOPHEN 650 MG: 650 SUPPOSITORY RECTAL at 08:08

## 2021-01-01 RX ADMIN — INSULIN LISPRO 6 UNITS: 100 INJECTION, SOLUTION INTRAVENOUS; SUBCUTANEOUS at 08:11

## 2021-01-01 RX ADMIN — INSULIN LISPRO 12 UNITS: 100 INJECTION, SOLUTION INTRAVENOUS; SUBCUTANEOUS at 08:54

## 2021-01-01 RX ADMIN — BENZONATATE 100 MG: 100 CAPSULE ORAL at 03:30

## 2021-04-12 NOTE — PROGRESS NOTES
Electrophysiology Clinic Consult     Mery Collazo  1960 04/13/21    DATE OF ADMISSION: (Not on file)  [facility] CARDIOLOGY    Trent Freeman MD  [address] / [place] [address]    Chief Complaint   Patient presents with   • Congestive Heart Failure   • Irregular Heart Beat   • Atrial Fibrillation     Referring: Dr Ramirez    CC: Afib     Problem List:   1. Atrial Fibrillation   a. [id number]: 3 (gender, diastolic heart failure, DM)   b. 2/26/21 Echo moderate concentric LVH, hyperdynamic LV with EF of greater than 70%.  Grade 1 diastolic dysfunction mild to moderately dilated left atrium.  Mild mitral annular calcification with mild MR, mild aortic regurgitation, mild tricuspid regurgitation.  Estimated pulmonary arterial systolic pressure elevated at 45 to 50 mmHg  c. Documented during 2/26/21 hospitalization   2. Diastolic heart failure   3. Chronic Kidney disease stage III-IV  4. Diabetes type II  5. Hepatitis C  6. COPD  7. 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 Chronic Anemia   9. ETOH Abuse  10. Tobacco Abuse   11. History of Polysubstance abuse/ IV drug use with + toxicology for cocaine during 2/26/21 admission           History of Present Illness: Mrs. Collazo is a 61-year-old black female referred by Dr. Ramirez In consultation for atrial fibrillation.  She has complex medical history including type 2 diabetes poorly controlled with hemoglobin A1c as high as 18, hepatitis C, COPD, Hx of Tob abuse, large cell neuroendocrine tumor of the lung for which she recently finished chemo in the fall of 202 and retains a right sided chest port, polysubstance abuse, chronic kidney disease, chronic anemia.  He was diagnosed with atrial fibrillation during February 2021 hospitalization at [facility]. On admission her BNP >24,000 and she had pleural effusions on chest CT.   Her symptoms  at the time of admission were progressive SOB over 1 week and fluid retention. She also had Chest tightness that seemed that resolved during the admission her troponin was negative. She has had some recurrent CP that is described as a stabbing sensation that can be in her left or right chest. And is brief and does not seem to be related to physical activity.   - During this admission she was given IV diltiazem and subsequently amiodarone and converted to NSR she was discharged with an Amio taper and  metoprolol.  She was also diuresed with IV lasix and discharged hoem on Lasix 20mg daily. She was also  discharged on Eliquis 5mg BID - she has tolerated it well without bleeding problems. She is not aware of any further AF episodes since discharge.     She reports ongoing smoking, marijuana use, and occasional other drug use.   She drink caffeine daily   No TSH documented on records sent- we will request        No Known Allergies        Current Outpatient Medications:   •  amiodarone (PACERONE) 200 MG tablet, Take 200 mg by mouth Daily., Disp: , Rfl:   •  apixaban (ELIQUIS) 5 MG tablet tablet, Take 5 mg by mouth 2 (Two) Times a Day., Disp: , Rfl:   •  furosemide (LASIX) 40 MG tablet, Take 40 mg by mouth 2 (Two) Times a Day., Disp: , Rfl:   •  insulin aspart (novoLOG FLEXPEN) 100 UNIT/ML solution pen-injector sc pen, Inject 5 Units under the skin into the appropriate area as directed 3 (Three) Times a Day With Meals., Disp: , Rfl:   •  Insulin Glargine (BASAGLAR KWIKPEN) 100 UNIT/ML injection pen, Inject 15 Units under the skin into the appropriate area as directed Daily., Disp: , Rfl:   •  metoprolol succinate XL (TOPROL-XL) 50 MG 24 hr tablet, Take 50 mg by mouth 2 (two) times a day., Disp: , Rfl:     Social History     Socioeconomic History   • Marital status:      Spouse name: Not on file   • Number of children: Not on file   • Years of education: Not on file   • Highest education level: Not on file   Tobacco  "Use   • Smoking status: Current Some Day Smoker   • Smokeless tobacco: Never Used   Substance and Sexual Activity   • Alcohol use: Not Currently     Comment: former alcohol abuse   • Drug use: Not Currently     Types: Marijuana     Comment: former   • Sexual activity: Defer       Family History   Problem Relation Age of Onset   • Heart disease Mother    • No Known Problems Father        REVIEW OF SYSTEMS:   CONST:  No weight loss, fever, chills, +weakness or +fatigue.   HEENT:  No visual loss, blurred vision, double vision, yellow sclerae.                   No hearing loss, congestion, sore throat.   SKIN:      No rashes, urticaria, ulcers, sores.     RESP:     + shortness of breath, hemoptysis, cough, sputum.   GI:           No anorexia, nausea, vomiting, diarrhea. No abdominal pain, melena.   :         No burning on urination, hematuria or increased frequency.  ENDO:    No diaphoresis, cold or heat intolerance. No polyuria or polydipsia.   NEURO:  No headache, dizziness, syncope, paralysis, ataxia, or parasthesias.                  No change in bowel or bladder control. No history of CVA/TIA  MUSC:    No muscle, back pain, joint pain or stiffness.   HEME:    No anemia, bleeding, bruising. No history of DVT/PE.  PSYCH:  No history of depression, anxiety    Vitals:    04/13/21 1415   BP: 118/74   BP Location: Left arm   Patient Position: Sitting   Pulse: 52   Weight: 82.6 kg (182 lb)   Height: 170.2 cm (67\")       Physical Exam:  GEN: Well nourished, well-developed, no acute distress  HEENT: Normocephalic, atraumatic, PERRLA, moist mucous membranes  NECK: Supple, NO JVD, no thyromegaly, no lymphadenopathy  CARD: Regular rate and rhythm normal S1-S2.  S4 is present.  LUNGS: Diffuse wheezing throughout all lung fields.  Expiratory in nature.  ABDOMEN: Soft, nontender, normal bowel sounds  EXTREMITIES: No gross deformities, no clubbing, cyanosis, or edema  SKIN: Warm, dry  NEURO: No focal deficits, alert and oriented " x 3  PSYCHIATRIC: Normal affect and mood      I personally viewed and interpreted the patient's EKG/Telemetry/lab data            ECG 12 Lead    Date/Time: 4/13/2021 2:43 PM  Performed by: Le Bran APRN  Authorized by: Le Bran APRN   Comparison: not compared with previous ECG   Rhythm: sinus bradycardia  BPM: 52                ICD-10-CM ICD-9-CM   1. Atrial fibrillation, unspecified type (CMS/HCC)  I48.91 427.31       Assessment and Plan:   1) new onset  atrial fibrillation diagnosed 2/26/2021 [facility]  -[id number]  3: Anticoagulated Eliquis 5 mg twice daily- continue  -Currently  on amiodarone 200 mg daily    - Medical Treatment of atrial fibrillation may prove difficult in the future given her kidney disease (chronic kidney disease stage III with recent creatinine of 1.63), hepatitis with transaminitis per most recent hospitalization (full laboratory evaluation unavailable) and her wishes not to be aggressive procedurally wise.  Amiodarone although functioning well at this time would not be a very good long-term drug for this patient.  I would like to use flecainide instead.  However she would need a stress Cardiolite prior to initiation of flecainide.  Obviously, she needs to stop doing polysubstance drug abuse.  -Stressed importance of trigger avoidance, substance abuse and management of other medical conditions and exacerbations can be triggers for Afib.       2) Diastolic Heart Failure   -Echo 2/26/2021 echocardiogram per Dr. Ramirez EF greater than 70%, moderate concentric LVH, grade 1 diastolic dysfunction.  Mild to moderately dilated ablated left atrium, mild mitral annular calcification with mild MR, mild TR    3) Chronic Kidney Disease -Stage III     4) DM II     5) Polysubstance abuse     6) Neuroendocrine Tumor of the Lung with recent chemo treatment.       We will set up Cardiolite Lexiscan with Dr. Ramirez's office.  If no significant CAD, will then initiate flecainide and  discontinue amiodarone      Scribed for Jason Marte MD by OCTAVIA Jackson. 4/13/2021  14:49 EDT    I, Jason Marte MD, personally performed the services described in this documentation as scribed by the above named individual in my presence, and it is both accurate and complete.  4/13/2021  15:15 EDT

## 2021-04-27 PROBLEM — J98.59 MEDIASTINAL MASS: Status: ACTIVE | Noted: 2021-01-01

## 2021-04-28 PROBLEM — I10 ESSENTIAL HYPERTENSION: Status: ACTIVE | Noted: 2021-01-01

## 2021-04-28 PROBLEM — I50.33 ACUTE ON CHRONIC DIASTOLIC HEART FAILURE (HCC): Status: ACTIVE | Noted: 2021-01-01

## 2021-04-28 PROBLEM — E11.65 TYPE 2 DIABETES MELLITUS WITH HYPERGLYCEMIA (HCC): Status: ACTIVE | Noted: 2021-01-01

## 2021-04-28 PROBLEM — A41.9 SEPSIS DUE TO PNEUMONIA (HCC): Status: ACTIVE | Noted: 2021-01-01

## 2021-04-28 PROBLEM — I48.20 CHRONIC A-FIB (HCC): Status: ACTIVE | Noted: 2021-01-01

## 2021-04-28 PROBLEM — N18.4 CKD (CHRONIC KIDNEY DISEASE) STAGE 4, GFR 15-29 ML/MIN (HCC): Status: ACTIVE | Noted: 2021-01-01

## 2021-04-28 PROBLEM — J96.01 ACUTE RESPIRATORY FAILURE WITH HYPOXIA (HCC): Status: ACTIVE | Noted: 2021-01-01

## 2021-04-28 PROBLEM — J44.0 CHRONIC OBSTRUCTIVE PULMONARY DISEASE WITH ACUTE LOWER RESPIRATORY INFECTION (HCC): Status: ACTIVE | Noted: 2021-01-01

## 2021-04-28 PROBLEM — J18.9 SEPSIS DUE TO PNEUMONIA (HCC): Status: ACTIVE | Noted: 2021-01-01

## 2021-04-28 PROBLEM — C7A.8 NEUROENDOCRINE CARCINOMA OF LUNG (HCC): Status: ACTIVE | Noted: 2021-01-01

## 2021-04-29 NOTE — ANESTHESIA PROCEDURE NOTES
Airway  Urgency: elective    Date/Time: 4/29/2021 10:18 AM  Airway not difficult    General Information and Staff    Patient location during procedure: OR  Anesthesiologist: Sidney Allen MD  CRNA: Zohra Echavarria CRNA    Indications and Patient Condition  Indications for airway management: airway protection    Preoxygenated: yes  MILS not maintained throughout  Mask difficulty assessment: 0 - not attempted    Final Airway Details  Final airway type: supraglottic airway      Successful airway: LMA  Size 4    Number of attempts at approach: 1  Assessment: lips, teeth, and gum same as pre-op and atraumatic intubation

## 2021-04-29 NOTE — ANESTHESIA PREPROCEDURE EVALUATION
Anesthesia Evaluation     Patient summary reviewed   NPO Solid Status: > 8 hours  NPO Liquid Status: > 2 hours           Airway   Mallampati: I  Dental    (+) poor dentition    Pulmonary    (+) pneumonia , a smoker Current Abstained day of surgery, lung cancer, COPD, asthma,decreased breath sounds,   Cardiovascular     Rate: normal    (+) hypertension, dysrhythmias Atrial Fib, CHF ,       Neuro/Psych  GI/Hepatic/Renal/Endo    (+) obesity,   hepatitis, liver disease, renal disease ESRD, diabetes mellitus,     Musculoskeletal     Abdominal    Substance History      OB/GYN          Other      history of cancer                  Anesthesia Plan    ASA 4     general     intravenous induction     Anesthetic plan, all risks, benefits, and alternatives have been provided, discussed and informed consent has been obtained with: patient.

## 2021-04-29 NOTE — ANESTHESIA POSTPROCEDURE EVALUATION
"Patient: Mery Collazo    Procedure Summary     Date: 04/29/21 [facility]    Anesthesia Start: 1003 Anesthesia Stop: 1124    Procedure: BRONCHOSCOPY WITH ENDOBRONCHIAL ULTRASOUND WITH TBNA , BRUSHINGS & BAL (Bilateral Bronchus) Diagnosis:       Mediastinal mass      (Mediastinal mass [J98.59])    Surgeons: Angel Benitez MD Provider: Sidney Allen MD    Anesthesia Type: general ASA Status: 4          Anesthesia Type: general    Vitals  Vitals Value Taken Time   /68 04/29/21 1140   Temp     Pulse 106 04/29/21 1140   Resp 20 04/29/21 1140   SpO2 95 % 04/29/21 1140           Post Anesthesia Care and Evaluation    Pain management: adequate  Airway patency: patent  Anesthetic complications: No anesthetic complications    Cardiovascular status: acceptable  Respiratory status: acceptable  Hydration status: acceptable    Comments: /93 (BP Location: Right arm, Patient Position: Lying)   Pulse 115   Temp 36.9 °C (98.5 °F) (Oral)   Resp 20   Ht 170.2 cm (67\")   Wt 87.1 kg (192 lb)   SpO2 95%   BMI 30.07 kg/m²         "

## 2021-05-04 PROBLEM — I50.33 ACUTE ON CHRONIC DIASTOLIC HEART FAILURE (HCC): Status: RESOLVED | Noted: 2021-01-01 | Resolved: 2021-01-01

## 2021-05-04 PROBLEM — A41.9 SEPSIS DUE TO PNEUMONIA (HCC): Status: RESOLVED | Noted: 2021-01-01 | Resolved: 2021-01-01

## 2021-05-04 PROBLEM — J18.9 SEPSIS DUE TO PNEUMONIA (HCC): Status: RESOLVED | Noted: 2021-01-01 | Resolved: 2021-01-01

## 2021-05-05 NOTE — OUTREACH NOTE
Prep Survey      Responses   [facility] facility patient discharged from?  [facility]   Is LACE score < 7 ?  No   Emergency Room discharge w/ pulse ox?  No   Eligibility  Readm Mgmt   Discharge diagnosis  Neuroendocrine carcinoma of lung Sepsis due to pneumonia    Does the patient have one of the following disease processes/diagnoses(primary or secondary)?  Sepsis   Does the patient have Home health ordered?  No   Is there a DME ordered?  No   Prep survey completed?  Yes          Tamia Meredith RN

## 2021-05-10 NOTE — H&P
History and Physical      Patient Name: Mery Collazo   Patient ID: [id number]   Sex: Female   YOB: 1960    Primary Care Provider: Trent Freeman MD   Referring Provider: Dr. Betty Heart MD    Visit Date: May 15, 2020    Provider: Giorgi Redmond MD   Location[facility]   Location Address: [address] [place] [address]  [id number]   Location Phone: [phone]          Chief Complaint  · Outpatient History & Physical / Surgical Orders  · lung cancer, in need of port      History Of Present Illness     Mery came in today for evaluation. She is a really nice lady who unfortunately was recently diagnosed with a left lung cancer. She needs to get started on some chemotherapy and came in today to be seen about getting set up for a PowerPort placement.       Past Medical History  Arthritis; Arthritis; Chest pain; Diabetes; High blood pressure; Hypertension; Limb Pain; Limb Swelling; Right Shoulder Impingement; Shortness of Breath         Past Surgical History  *Metal Implant; Appendectomy; Artificial Joints/Limbs; Gallbladder; Joint Surgery; Metal implants         Medication List  glyburide 5 mg oral tablet; losartan 100 mg oral tablet; metformin oral; metoprolol tartrate 50 mg oral tablet; montelukast 10 mg oral tablet; Norco 5-325 mg oral tablet         Allergy List  NO KNOWN DRUG ALLERGIES         Family Medical History  Heart Disease; Cancer, Unspecified; Diabetes, unspecified type         Social History  Alcohol Use (Current some day); Claustophobic (Unknown); Homemaker.; lives with spouse; .; Recreational Drug Use (Never); Tobacco (Current some day)         Review of Systems  · Constitutional  o Denies  o : fever  · Eyes  o Denies  o : yellowish discoloration of eyes  · HENT  o Denies  o : difficulty swallowing  · Cardiovascular  o Denies  o : chest pain on exertion  · Respiratory  o Denies  o : shortness of breath  · Gastrointestinal  o Denies  o : nausea,  "vomiting, diarrhea, constipation  · Integument  o Denies  o : rash  · Neurologic  o Denies  o : tingling or numbness  · Musculoskeletal  o Denies  o : joint pain  · Endocrine  o Denies  o : weight gain, weight loss      Vitals  Date Time BP Position Site L\R Cuff Size HR RR TEMP (F) WT  HT  BMI kg/m2 BSA m2 O2 Sat        05/15/2020 09:38 AM       14  197lbs 2oz 5'  8\" 29.97 2.07           Physical Examination  · Constitutional  o Appearance  o : well-nourished, well developed, alert, in no acute distress  · Head and Face  o Head  o :   § Inspection  § : atraumatic, normocephalic  · Neck  o Inspection/Palpation  o : supple, normal range of motion  · Respiratory  o Inspection of Chest  o : normal inspection  o Auscultation of Lungs  o : breath sounds normal, no distress, clear to ascultate bilaterally  · Cardiovascular  o Heart  o :   § Auscultation of Heart  § : regular rate and rhythm, no murmur, gallop or rub  · Gastrointestinal  o Abdominal Examination  o : normal bowel sounds, non-tender, soft          Assessment  · Pre-Surgical Orders     V72.84  · Malignant neoplasm of left lung, unspecified part of lung     162.9/C34.92       Very nice lady who unfortunately was recently diagnosed with lung cancer.       Plan  · Orders  o Surgery Order (GENOR) - V72.84, 162.9/C34.92 - 06/04/2020  o [facility] Pre-Op Covid-19 Screening (46831) - V72.84, 162.9/C34.92 - 06/02/2020   Scheduled at [facility] at 8am.   · Medications  o Medications have been Reconciled  o Transition of Care or Provider Policy  · Instructions  o ****Surgical Orders****  o Outpatient  o RISK AND BENEFITS:  o Consent for surgery: Given these options, the patient has verbally expressed an understanding of the risks of surgery and finds these risks acceptable. We will proceed with surgery as soon as possible.  o Consult Anesthesia for any post-operative block, or any pain management procedure deemed necessary by the anestesiologist for adequate post-operative pain " control.   o O.R. PREP: Per protocol  o SCD's preoperatively  o PLEASE SIGN PERMIT FOR: Powerport placement  o *__Kefzol 2 gram IV on call to OR.  o *___The above History and Physical Examination has been completed within 30 days of admission.     We are going to set her up for a PowerPort placement. I have described the procedure to her as well as the risks and benefits and she is agreeable to proceeding.             Electronically Signed by: Eugenie Larson-, -Author on May 18, 2020 08:24:52 AM  Electronically Co-signed by: Giorgi Redmond MD -Reviewer on May 28, 2020 09:42:18 AM

## 2021-05-12 NOTE — PROGRESS NOTES
Progress Note      Patient Name: Mery Collazo   Patient ID: [id number]   Sex: Female   YOB: 1960    Referring Provider: Bart Nugent MD    Visit Date: April 8, 2020    Provider: Bart Nugent MD   Location: [facility]   Location Address: [address] [place] [address]  [id number]   Location Phone: [phone]          Chief Complaint  · Left Reverse Total Shoulder Arthroplasty  · Right Wrist Pain      History Of Present Illness  Mery Collazo is a 60 year old /Black female who presents today to [facility].      Patient is post-op left reverse total shoulder arthroplasty performed on 2/19/19. Patient complains of posterior shoulder pain today. Patient states she has been exercising upper body often.     She's also following up for right wrist pain. Patient states right wrist steroid injection received on 9/30/19 provided no pain relief. Latest right wrist x-rays revealed radial scaphoid and scaphoid lunate arthritis.               Past Medical History  Arthritis; Chest pain; Diabetes; Hypertension; Limb Swelling; Right Shoulder Impingement; Shortness of Breath         Past Surgical History  Appendectomy; Artificial Joints/Limbs; Gallbladder; Joint Surgery; Metal implants         Medication List  Diovan 160 mg oral tablet; glyburide 5 mg oral tablet; hydrochlorothiazide 25 mg oral tablet; Medrol (Abdulkadir) 4 mg oral tablets,dose pack; metformin oral; Norco 5-325 mg oral tablet; propranolol 160 mg oral capsule,extended release 24 hr; Ultram 50 mg oral tablet; Valium 10 mg oral tablet         Allergy List  NO KNOWN DRUG ALLERGIES         Family Medical History  Heart Disease; Cancer, Unspecified; Diabetes, unspecified type         Social History  Alcohol Use (Current some day); Claustophobic (Unknown); Homemaker.; lives with spouse; .; Recreational Drug Use (Never); Tobacco (Current some day)         Review of Systems  · Constitutional  o Denies  o :  "fever, chills, weight loss  · Cardiovascular  o Denies  o : chest pain, shortness of breath  · Gastrointestinal  o Denies  o : liver disease, heartburn, nausea, blood in stools  · Genitourinary  o Denies  o : painful urination, blood in urine  · Integument  o Denies  o : rash, itching  · Neurologic  o Denies  o : headache, weakness, loss of consciousness  · Musculoskeletal  o Admits  o : painful, swollen joints  · Psychiatric  o Denies  o : drug/alcohol addiction, anxiety, depression      Vitals  Date Time BP Position Site L\R Cuff Size HR RR TEMP (F) WT  HT  BMI kg/m2 BSA m2 O2 Sat HC       04/08/2020 01:56 PM         189lbs 0oz 5'  8\" 28.74 2.03           Physical Examination  · Constitutional  o Appearance  o : well developed, well-nourished, no obvious deformities present  · Head and Face  o Head  o :   § Inspection  § : normocephalic  o Face  o :   § Inspection  § : no facial lesions  · Eyes  o Conjunctivae  o : conjunctivae normal  o Sclerae  o : sclerae white  · Ears, Nose, Mouth and Throat  o Ears  o :   § External Ears  § : appearance within normal limits  § Hearing  § : intact  o Nose  o :   § External Nose  § : appearance normal  · Neck  o Inspection/Palpation  o : normal appearance  o Range of Motion  o : full range of motion  · Respiratory  o Respiratory Effort  o : breathing unlabored  o Inspection of Chest  o : normal appearance  o Auscultation of Lungs  o : no audible wheezing or rales  · Cardiovascular  o Heart  o : regular rate  · Gastrointestinal  o Abdominal Examination  o : soft and non-tender  · Skin and Subcutaneous Tissue  o General Inspection  o : intact, no rashes  · Psychiatric  o General  o : Alert and oriented x3  o Judgement and Insight  o : judgment and insight intact  o Mood and Affect  o : mood normal, affect appropriate  · Left Shoulder  o Inspection  o : Well-healed scar. No signs of infection. Near-full forward flexion. Tender over trapezius. Good tone of deltoid, biceps, triceps, " wrist extensors, and wrist flexors. Neurovascularly intact. Sensation grossly intact.   · Right Wrist  o Inspection  o : She has near full range of motion of her wrist. She has some swelling of the radiocarpal joint. She has positive crepitus with Watsons maneuver. She has negative Finkelsteins. Negative Tinels. X-rays reveal radial scaphoid and scaphoid lunate arthritis SLAC (scaphoid lunate advanced collapse) wrist.   · Injection Note/Aspiration Note  o Site  o : left shoulder   o Procedure  o : Procedure: After educating the patient, patient gave consent for procedure. After using Chloraprep, the joint space was injected. The patient tolerated the procedure well.   o Medication  o : 80 mg of DepoMedrol with 1cc of 1% Lidocaine  · In Office Procedures  o View  o : AP/LATERAL  o Site  o : left, scapula  o Indication  o : Left Reverse Total Shoulder Arthroplasty  o Study  o : X-rays ordered, taken in the office, and reviewed today.  o Xray  o : Stable implant without signs of loosening.   o Comparative Data  o : No comparative data found          Assessment  · Aftercare left reverse total shoulder arthroplasty 2/19/19     V54.81/Z47.1  · Arthralgia of right hand     719.44/M25.541  · Left shoulder pain, unspecified chronicity     719.41/M25.512  Left trapezius trigger point  · Right wrist SLAC osteoarthritis     715.13/M19.031      Plan  · Orders  o Depo-Medrol injection 80mg () - - 04/08/2020   Lot [id number] Exp 05 2021 [employer] Administered by FERNY Nugent MD  o Injection(s); Trigger point(s), 1 or 2 muscle groups (60664) - - 04/08/2020   Lot [id number] Exp 07 01 2021 [employer] Administered by FERNY Nugent MD  o Scapula (Left) [facility] Preferred View (75725-LV) - 719.41/M25.512 - 04/08/2020  · Medications  o Medications have been Reconciled  o Transition of Care or Provider Policy  · Instructions  o Reviewed the patient's Past Medical, Social, and Family history as well as the ROS at today's visit, no  changes.  o Call or return if worsening symptoms.  o The above service was scribed by Kayy Espinoza on my behalf and I attest to the accuracy of the note. haley  o For the right wrist, the plan is to refer patient to Dr. Merrill. For the left shoulder, discussed conservative management with patient. The plan is a left shoulder trigger point injection. Prescribed Norco. Follow-up PRN.            Electronically Signed by: Jodi Espinoza - , Other -Author on April 9, 2020 10:04:22 AM  Electronically Co-signed by: Bart Nugent MD -Reviewer on April 10, 2020 05:04:45 PM

## 2021-05-12 NOTE — OUTREACH NOTE
Sepsis Week 2 Survey      Responses   [facility] patient discharged from?  [place]   Does the patient have one of the following disease processes/diagnoses(primary or secondary)?  Sepsis   Week 2 attempt successful?  No   Unsuccessful attempts  Attempt 1          Wilfredo Carmen RN

## 2021-05-13 NOTE — PROGRESS NOTES
Progress Note      Patient Name: Mery Collazo   Patient ID: [id number]   Sex: Female   YOB: 1960    Primary Care Provider: Trent Freeman MD   Referring Provider: Dr. Betty Heart MD    Visit Date: June 22, 2020    Provider: Giorgi Redmond MD   Location[facility]   Location Address: [address] [place] [address]  [id number]   Location Phone: [phone]          Chief Complaint  · Follow Up Office Visit      History Of Present Illness     Mery came in today for evaluation. She is a very nice lady who is doing well following PowerPort placement. She has had one treatment through the port and everything went well.       Past Medical History  Arthritis; Arthritis; Chest pain; Diabetes; High blood pressure; Hypertension; Limb Pain; Limb Swelling; Right Shoulder Impingement; Shortness of Breath         Past Surgical History  *Metal Implant; Appendectomy; Artificial Joints/Limbs; Gallbladder; Joint Surgery; Metal implants; Port Placement         Medication List  glyburide 5 mg oral tablet; losartan 100 mg oral tablet; metformin oral; metoprolol tartrate 50 mg oral tablet; montelukast 10 mg oral tablet; Norco 5-325 mg oral tablet         Allergy List  NO KNOWN DRUG ALLERGIES         Family Medical History  Heart Disease; Cancer, Unspecified; Diabetes, unspecified type         Social History  Alcohol Use (Current some day); Claustophobic (Unknown); Homemaker.; lives with spouse; .; Recreational Drug Use (Never); Tobacco (Current some day)         Review of Systems  · Cardiovascular  o Denies  o : chest pain, irregular heart beats, rapid heart rate, chest pain on exertion, shortness of breath, lower extremity swelling  · Respiratory  o Denies  o : shortness of breath, wheezing, cough, wheezing, chronic cough, coughing up blood  · Gastrointestinal  o Denies  o : nausea, vomiting, diarrhea, chronic abdominal pain, reflux symptoms      Vitals  Date Time BP Position Site  "L\R Cuff Size HR RR TEMP (F) WT  HT  BMI kg/m2 BSA m2 O2 Sat HC       06/22/2020 02:03 PM       14  197lbs 0oz 5'  8\" 29.95 2.07           Physical Examination     Today on physical exam, the incisions look good. There is no evidence of infection.           Assessment  · Postoperative Exam Following Surgery     V67.00       Doing well status post PowerPort placement.       Plan  · Medications  o Medications have been Reconciled  o Transition of Care or Provider Policy  · Instructions  o Follow up as needed.            Electronically Signed by: Eugenie Larson-, -Author on June 23, 2020 09:54:22 AM  Electronically Co-signed by: Giorgi Redmond MD -Reviewer on June 29, 2020 07:27:34 AM  "

## 2021-05-21 NOTE — OUTREACH NOTE
Sepsis Week 3 Survey      Responses   [facility] patient discharged from?  [place]   Does the patient have one of the following disease processes/diagnoses(primary or secondary)?  Sepsis   Week 3 attempt successful?  Yes   Call start time  1545   Call end time  1546   Discharge diagnosis  Neuroendocrine carcinoma of lung Sepsis due to pneumonia    Meds reviewed with patient/caregiver?  Yes   Is the patient having any side effects they believe may be caused by any medication additions or changes?  No   Does the patient have all medications related to this admission filled (includes all antibiotics, inhalers, nebulizers,steroids,etc.)  Yes   Is the patient taking all medications as directed (includes completed medication regime)?  Yes   Does the patient have a primary care provider?   Yes   Does the patient have an appointment with their PCP within 7 days of discharge?  Yes   Has the patient kept scheduled appointments due by today?  Yes   Has home health visited the patient within 72 hours of discharge?  N/A   Psychosocial issues?  No   Did the patient receive a copy of their discharge instructions?  Yes   Nursing interventions  Reviewed instructions with patient   What is the patient's perception of their health status since discharge?  Improving   Nursing interventions  Nurse provided patient education   Is the patient/caregiver able to teach back Sepsis?  S - Shivering,fever or very cold, E - Extreme pain or generalized discomfort (worst ever,especially abdomen), P - Pale or discolored skin, I -   I feel like I might die-a feeling of hopelessness, S - Sleepy, difficult to arouse,confused, S - Short of breath   Nursing interventions  Nurse provided patient education   Is patient/caregiver able to teach back steps to recovery at home?  Set small, achievable goals for return to baseline health, Make a list of questions for PCP appoinment   Is the patient/caregiver able to teach back signs and symptoms of  worsening condition:  Fever, Hyperthermia, Shortness of breath/rapid respiratory rate, Rapid heart rate (>90), Altered mental status(confusion/coma)   If the patient is a current smoker, are they able to teach back resources for cessation?  Smoking cessation medications   Is the patient/caregiver able to teach back the hierarchy of who to call/visit for symptoms/problems? PCP, Specialist, Home health nurse, Urgent Care, ED, 911  Yes   Week 3 call completed?  Yes          Wilfredo Carmen RN

## 2021-05-28 NOTE — PROGRESS NOTES
Patient: JULIA HOANG     Acct: [id number]     Report: #[id number]  UNIT #: [id number]     : 1960    Encounter Date:2020  PRIMARY CARE: Trent Freeman  ***Signed***  --------------------------------------------------------------------------------------------------------------------  NURSE INTAKE      Visit Type      Established Patient Visit            Chief Complaint      LUNG CANCER; HERE FOR TX            Referring Provider/Copies To      Primary Care Provider:  Trent Freeman      Copies To:   Trent Freeman            History and Present Illness      Past Oncology Illness History      Ms. Hoang a 60-year-old female who was referred to me for new diagnosis of     lung cancer by Dr. Perry.              She initially presented with chest pain and underwent a chest CT due to concern     for coronavirus.  This chest CT was notable for mediastinal adenopathy up to 3.8    x 5.1 x 5.9 cm.  Patient underwent endobronchial ultrasound on 2020 and     pathology showed large cell neuroendocrine carcinoma. Patient comes in today to     establish care.  She understands that she has a diagnosis of lung cancer but     does not understand the specifics.  Currently she is asymptomatic as the chest     pain has resolved.  She denies significant shortness of breath or cough.  She     does have occasional headaches but relates this to recent stress.            2020 endobronchial ultrasound with biopsy: Lymph node, station 4 R+ for     large cell neuroendocrine carcinoma.  Tumor cells are positive for CD56, CK7,     NSE, pancytokeratin (dot-like), synaptophysin, and TTF-1.  Immunostaining was     negative for p40 and CK 5/6.            2020 brain MRI without contrast: 1) study is limited due to lack of     contrast but no definite acute findings.  2) volume loss indicating age-    appropriate atrophy.  3) chronic ischemic changes.            Patient has smoked for many  years even decades.  She states that she is always     been a light smoker and currently smokes less than a half a pack per day.            PET/CT on 5/4/2020: There appears to be a large right paratracheal mass measures    approximately 5.7 cm in maximum dimension and is similar to the prior CT.  The     maximum SUV is 13.2.  The hypermetabolic activity extends into the precarinal     mass.  There is abnormal FDG activity in the left infrahilar region where there     is suspected adenopathy with a maximum SUV of 7.1.  There are no other     definitive findings of malignancy throughout the chest, abdomen, or pelvis.            HPI - Oncology Interim      Patient comes into clinic today for first cycle of chemotherapy.  She states     that she has had a dry cough for the last several weeks to months.  Discussed     that this was likely result of her lung cancer which seem to surprise the     patient.  She has no other specific complaints today.            ECOG Performance Status      0            Most Recent Lab Findings      Laboratory Tests      6/8/20 09:06            PAST, FAMILY   Past Medical History      Past Medical History:  Diabetes Type 2      Hematology/Oncology (F):  Lung Cancer      Genetic/Metabolic:  None            Past Surgical History      Biopsy, Cholecystectomy, Joint Replacement (SHOULDER)            CARPAL TUNNEL X2 RIGHT HAND      TUBAL LIGATION            Family History      Family History:  No Family History            Social History      Marital Status:        Lives independently:  Yes      Number of Children:  4      Occupation:  HOUSEWIFE            Tobacco Use      Tobacco status:  Light tobacco smoker (ONE PACK PER WEEK)            Alcohol Use      Alcohol intake:  3 TIMES WEEKLY            Substance Use      Substance use:  Marijuana            REVIEW OF SYSTEMS      General:  Denies: Appetite Change, Fatigue, Fever, Night Sweats, Weight Gain,     Weight Loss      Eye:  Denies  Blurred Vision, Denies Corrective Lenses, Denies Diplopia, Denies     Vision Changes      ENT:  Denies Headache, Denies Hearing Loss, Denies Hoarseness, Denies Sore     Throat      Cardiovascular:  Denies Chest Pain, Denies Palpitations      Respiratory:  Admits: Cough;          Denies: Coughing Blood, Productive Cough, Shortness of Air, Wheezing      Gastrointestinal:  Denies Bloody Stools, Denies Constipation, Denies Diarrhea,     Denies Nausea/Vomiting, Denies Problem Swallowing, Denies Unable to Control     Bowels      Genitourinary:  Denies Blood in Urine, Denies Incontinence, Denies Painful     Urination      Musculoskeletal:  Denies Back Pain, Denies Muscle Pain, Denies Painful Joints      Integumentary:  Denies Itching, Denies Lesions, Denies Rash      Neurologic:  Denies Dizziness, Denies Numbness\Tingling, Denies Seizures      Psychiatric:  Denies Anxiety, Denies Depression      Endocrine:  Denies Cold Intolerance, Denies Heat Intolerance      Hematologic/Lymphatic:  Denies Bruising, Denies Bleeding, Denies Enlarged Lymph     Nodes      Reproductive:  Denies: Menopause, Heavy Periods, Pregnant, Still Menstruating            VITAL SIGNS AND SCORES      Vitals      Height 5 ft 5.75 in / 167 cm      Weight 199 lbs 11.788 oz / 90.6 kg      BSA 1.99 m2      BMI 32.5 kg/m2      Temperature 97.3 F / 36.28 C - Temporal      Pulse 82      Respirations 20      Blood Pressure 141/89 Sitting      Pulse Oximetry 99%, ROOM AIR            Pain Score      Experiencing any pain?:  Yes      Pain Scale Used:  Numerical      Pain Intensity:  2            Fatigue Score      Experiencing any fatigue?:  No      Fatigue (0-10 scale):  0 (none)            EXAM      General: Alert, cooperative, no acute distress      Eyes: Anicteric sclera, PERRLA      HEENT: Oropharynx clear, no exudates      Respiratory: CTAB, normal respiratory effort      Abdomen: Normal active bowel sounds, no tenderness, no distention      Cardiovascular:  RRR, no murmur, no peripheral edema      Skin: Normal tone, no rash, no lesions      Psychiatric: Appropriate affect, intact judgment      Neurologic: No focal sensory or motor deficits, no weakness, numbness, dizziness      Musculoskeletal: Normal muscle strength, normal muscle tone      Extremities: No clubbing, cyanosis, or deformities            PREVENTION      Hx Influenza Vaccination:  No      Influenza Vaccine Declined:  No      2 or More Falls Past Year?:  No      Fall Past Year with Injury?:  No      Hx Pneumococcal Vaccination:  No      Encouraged to follow-up with:  PCP regarding preventative exams.      Chart initiated by      SANDIP SALAZAR CMA            ALLERGY/MEDS      Allergies      Coded Allergies:             OXYCODONE HCL (Verified  Allergy, Unknown, ITCHING- CAN TAKE TYLENOL ,     6/8/20)                  PT STATED SHE CAN TAKE TYLENOL            Medications      Last Reconciled on 6/14/20 23:21 by RUBA DOHERTY      (diuretic)   No Conflict Check               Reported         6/9/20       MDI-Albuterol (Ventolin HFA) 8 Gm Hfa.aer.ad      1 PUFFS INH Q4-6H PRN for SHORTNESS OF BREATH, #1 MDI 3 Refills         Prov: Luana Milian PA-C         5/5/20       Losartan Potassium (Losartan*) 100 Mg Tablet      100 MG PO QDAY, #30 TAB 0 Refills         Reported         4/20/20       metFORMIN HCl (metFORMIN HCl) 500 Mg Tablet      500 MG PO BID, #60 TAB 0 Refills         Reported         4/20/20       metFORMIN HCl (metFORMIN HCl) 1,000 Mg Tablet      1000 MG PO BID for 30 Days, #60 TAB         Reported         4/20/20       Montelukast Sodium (Montelukast*) 10 Mg Tablet      10 MG PO QDAY, TAB         Reported         2/7/19       glyBURIDE (glyBURIDE) 5 Mg Tablet      5 MG PO BID         Reported         3/15/11       Metoprolol Tartrate (Metoprolol Tartrate*) 50 Mg Tablet      50 MG PO BID         Reported         3/9/11      Medications Reviewed:  No Changes made to meds             IMPRESSION/PLAN      Impression      60-year-old female with limited stage large cell lung cancer here for first     cycle of chemotherapy.            Plan      Limited stage large cell lung cancer: Patient comes in today for her first cycle     of chemotherapy with cisplatin and etoposide.  She is also starting radiation     today.  She has no specific complaints except for dry cough which she was     unaware was related to her lung cancer.  She denies fevers, shortness of breath,     wheezing.  She return to clinic in 3 weeks for her next cycle.            Macrocytic anemia: I will check the patient's B12 and folate levels today.  We     can discuss the results at her next follow-up.            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  No     significant history of infections.  No need for further evaluation.            Patient Education      Patient Education Provided:  Yes            Electronically signed by RUBA DOHERTY  06/14/2020 23:21       Disclaimer: Converted document may not contain table formatting or lab diagrams. Please see [employer] for the authenticated document.

## 2021-05-28 NOTE — PROGRESS NOTES
Patient: MERY COLLAZO     Acct: [id number]     Report: #[id number]  UNIT #: [id number]     : 1960    Encounter Date:2021  PRIMARY CARE: Trent Freeman  ***Signed***  --------------------------------------------------------------------------------------------------------------------  History of Present Illness            Chief Complaint: F/U Post Pet            Mery Collazo is presenting for evaluation via Telehealth visit. Verbal     consent obtained before beginning visit.            PAST MEDICAL HISTORY/OVERVIEW OF PATIENT SYMPTOMS            HX: COPD            Symptoms: SOA, Cough (white)             Vaccinations: Not Current             Smoking Status: Current every day smoker .5 packs per week (25yo)            Provider spent (21) minutes with the patient during telehealth visit.            The following staff were present during this visit: Jim Carvajal MA, Van Perry MD            The patient is a 61 year old  female cigarette smoker with small cell     lung cancer here for follow up.  Since last visit, PET scan shows disease     progression versus organizing pneumonia. She states that 1-2 weeks prior to this     she was hospitalized at [facility] and underwent a endobronchial     ultrasound with transbronchial lung biopsies, all of which were negative for     malignancy.  They put her on two weeks of antibiotics and steroids. She states     that she is doing okay. She has a cough productive of yellow sputum still. She     is still short of breath with little activity. She can only walk about 300 feet     without having to stop.  It is moderate in severity, worse with exertion and     relieved with rest.  She denies any wheezing, headaches, chest pain, weight loss     or hemoptysis. She wears two liters of oxygen continuously.  She states that     her portable canister is too heavy for her due to multiple osteoarthritic r    easons and is  asking for a lighter canister.  I spoke with Dr. Doherty last week     and this PET scan was done one week after bronchoscopy that was negative.  Of     note, at [facility] they also did a fine needle aspiration of that right hilar     thickening and area. This appears to be more consistent with an organizing     pneumonia. She is able to perform her ADLs without difficulty.  Denies any     swollen glands or lymph nodes of the head and neck.  She is still smoking about     1/4 pack of cigarettes a day.            I have personally reviewed the review of systems, past family, social, surgical     and medical histories and I agree with the findings.              Physical exam deferred due to TeleHealth visit.              I reviewed Dr. Doherty's office note. I reviewed bronchoscopy notes from [facility]. I personally reviewed pathology including 11R fine needle aspiration     that was negative.  Transbronchial biopsies were negative for lung disease, but     showed some organizing pneumonia.  Bronchoscopy cultures were negative. I also     reviewed a PET scan done from 2021.                       [facility]                PACS RADIOLOGY REPORT            Patient: JULIA HOANG   Acct: #[id number]   Report: #[id number]            UNIT #: [id number]    DOS: 21 1025      INSURANCE:BLUE ACCESS NETWORK - [facility]   ORDER #:[id number]      LOCATION:PET     : 1960            PROVIDERS      ADMITTING:     ATTENDING: RUBA DOHERTY      FAMILY:  Freeman,Trent   ORDERING:  RUBA DOHERTY         OTHER:    DICTATING:  WAYNE PAUL MD            REQ #:[id number]   EXAM:[id number] - SKULL BASE-MIDTHIGH SUBSEQ fdg      REASON FOR EXAM:  C34.11      REASON FOR VISIT:  C34.11            *******Signed******         PROCEDURE:   PET CT SKULL BASE TO MID THIGH SUBSEQ             COMPARISON:   [facility], CT, CHEST W/O  CONTRAST, 2/25/2021,     21:22.  [facility], CT, CHEST W/O CONTRAST, 4/26/2021, 5:51.  [facility],     PET, SKULL BASE TO       MID THIGH SUBSEQ, 10/09/2020, 10:52.             INDICATIONS:   C34.11             TECHNIQUE:   After obtaining the patient's consent, F-18 FDG was administered     intravenously.  PET/CT       imaging was performed from skull to thigh with multi-planar imaging without oral     or intravenous       contrast material, using a dedicated integrated PET/CT scanner.               RADIONUCLIDE:     11.53 MCI   F18 FDG- I.V.      LABS:                          Blood Glucose 123 mg/dl      UPTAKE TIME:        56 mins      MEDIASTINAL BACKGROUND UPTAKE:  3.2             FINDINGS:         Head and neck:  There is no abnormal FDG accumulation in the head or the neck.      There are no acute       findings.  No evidence of adenopathy or a mass.             Chest:  Compared with 4/26/2021, there is increasing masslike consolidation th    roughout the majority       of the right upper lobe with patchy areas of peripheral airspace consolidation.      There is slightly       increased soft tissue density surrounding the right hilum, which nearly     obstructs the bronchus to       the right upper lobe and does obstruct multiple airways in the right upper lobe     and results in       narrowing of the right middle lobe and right lower lobe airways.  There is     stable abnormal dense       consolidation surrounding the left hilar structures involving both lobes in the     left lung with       narrowing of the airways to the left upper and left lower lobes without complete     obstruction.        These areas of airspace consolidation appear hypermetabolic.  The most active     FDG activity is in       the right lung apex with a maximum SUV of 11.0.  Maximum SUV at the right hilum     is 4.6 and maximum       SUV at the left hilum is 3.2.  There are new small bilateral  pleural effusions.      Previously       identified mediastinal fluid and gas collection appears nearly completely     resolved with a small       focus of gas in this region anterior to the right-side of the mid trachea.      There is a stable       right-sided chest port.  There is no discrete measurable mediastinal adenopathy.      There is no       pneumothorax.             Abdomen and pelvis:  There is no abnormal FDG accumulation in the abdomen or the     pelvis.  There is       physiologic activity in the liver, kidneys and urinary bladder.  There is no     evidence of adenopathy       or a mass.  There are calcified degenerated uterine fibroids.  There are no     acute findings in the       abdomen or the pelvis.             CONCLUSION:         1. Increasing masslike consolidation throughout the right upper lobe with     surrounding airspace       disease.  There is slightly increased abnormal soft tissue density encasing both     of the kylie       resulting in narrowing of all of the airways.  Maximum SUV at the right lung     apex is 11.0 and       measures 3.2 at the left hilum and 4.6 at the right hilum.  Malignancy seems     most possible at the       medial aspect of the right upper lobe in soft tissue diagnosis is recommended.      Infectious,       inflammatory granulomatous processes remain in the differential.      2. No evidence of hypermetabolic disease outside the chest.              WAYNE PAUL MD             Electronically Signed and Approved By: WAYNE PAUL MD on 5/12/2021 at 13:53                                   Until signed, this is an unconfirmed preliminary report that may contain      errors and is subject to change.                                              ALTD1:      D:05/12/21 1353                         Allergies and Medications      Allergies:        Coded Allergies:             OXYCODONE HCL (Verified  Allergy, Unknown, ITCHING- CAN TAKE TYLENOL ,     5/24/21)                   PT STATED SHE CAN TAKE TYLENOL      Medications    Last Reconciled on 5/24/21 09:21 by CATA PLUMMER MD      predniSONE (Deltasone) 10 Mg Tablet      10 MG PO ASDIR, #172 TAB 0 Refills         Prov: CATA PLUMMER         5/24/21       Amoxicillin/Clavulanic Acid 875/125 (Augmentin 875/125) 1 Each Tablet      875 MG PO BID, #28 TAB 0 Refills         Prov: CATA PLUMMER         5/24/21       Lidocaine/Prilocaine (Emla) 30 Gm Cream..g.      1 APL TOPICAL ASDIR, #2 GM 2 Refills         Prov: DOHERTYRUBA         5/20/21       Neb-Albuterol/Ipratropium (Ipratropium/Albuterol) 3 Ml Ampul.neb      3 ML INH Q4H PRN for SHORTNESS OF BREATH, #180 NEB 0 Refills         Reported         5/18/21       Budesonide/Formoterol Fumarate (Symbicort 160/4.5 Mcg) 10.2 Gm Inh      2 PUFF INH RTBID, #1 INH 0 Refills         Reported         5/18/21       predniSONE (Deltasone) 5 Mg Tablet      10 MG PO QDAY, TAB         Reported         5/18/21       Atenolol (ATENOLOL) 25 Mg Tablet      25 MG PO QDAY, #30 TAB 0 Refills         Reported         5/18/21       Benzonatate (Benzonatate) 100 Mg Capsule      200 MG PO Q4HR PRN for COUGH, #60 CAP         Reported         5/18/21       Amiodarone Hcl (Amiodarone) 400 Mg Tablet      400 MG PO QDAY         Reported         4/26/21       Insulin Human Aspart (novoLOG FLEXPEN) 100 Unit/1 Ml Insuln.pen      5 UNITS SUBQ AC, #1 BOX 0 Refills         Prov: Juantia Carcamo         3/3/21       Insulin Glargine,Hum.rec.anlog (Basaglar Kwikpen U-100) 100 Unit/1 Ml Insuln.pen      15 UNITS SUBQ QDAY, #1 BOX         Prov: Juanita Carcamo         3/3/21            Assessment      Shortness of Air  R06.02            Plan      Orders:  Phone Eval 21-30 mi [id number]      Instructions      * Chronic conditions reviewed and taken in consideration for today's treatment       plan.      * Plan Of Care: ()      * Patient instructed to seek medical attention urgently for new or worsening       symptoms.      *  Patient was educated/instructed on their diagnosis, treatment and medications       today.      * Recommend self monitoring. Instructions given.      * Recommend self quarantine for 14 days.      * Recommend self quarantine until without fever for 72 hours without using fever       reducing medications.      * Recommends over the counter medications for symptom management.            IMPRESSION:      1. 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      2.  Metastatic lymphadenopathy.      3.  Question of organizing pneumonia versus bacterial pneumonia.  Has failed a     course of antibiotics.  She did have recent bronchoscopy that was negative,     would benefit from steroids and antibiotics.      4. Ongoing tobacco abuse with cigarettes.      5. Chronic hypoxemic respiratory failure.      6.  Dyspnea.      7. COPD without exacerbation.      8.  Ongoing tobacco abuse with cigarettes.              PLAN:        1. We will give two weeks of Augmentin and we will do a truncated two month     steroid taper.  We will start prednisone 40 mg daily and decrease by 10 mg every     14 days until patient is off steroids.      2. Given recent bronchoscopy negative for malignancy, I do not see an indication     to repeat bronchoscopy at this time.  I am going to get the pathology reports     from [facility] for me to personally visualize. The negative report was     reported to me by the patient as well as by Dr. Heart's office note.      3. Continue 2 liters of oxygen to keep SPO2 greater than 90%. We will reach out     to [employer] and see if we can get her a lighter portable O2 canister.      4. Smoking cessation counseling provided.  I spent 5 minutes today counseling     the patient on risks of smoking, including throat cancer, lung cancer, COPD,     heart disease and death. I also discussed the benefits of quitting.        5. Up-to-date with flu, Prevnar and Pneumovax.      6. We will have  her follow up with me in two months and at that time I will     repeat noncontrast chest CT.      7. I spent 21 minutes of time today in a TeleHealth visit with this patient,     discussing the case with the patient over the phone and personally reviewing all     pertinent labs, imaging and provider notes.        PLAN:      1.            Electronically signed by CATA PLUMMER  05/26/2021 08:27       Disclaimer: Converted document may not contain table formatting or lab diagrams. Please see [employer] for the authenticated document.

## 2021-05-28 NOTE — PROGRESS NOTES
Patient: JULIA HOANG     Acct: [id number]     Report: #[id number]  UNIT #: [id number]     : 1960    Encounter Date:2020  PRIMARY CARE: Trent Freeman  ***Signed***  --------------------------------------------------------------------------------------------------------------------  NURSE INTAKE      Visit Type      Established Patient Visit            Chief Complaint      LUNG CANCER TREATMENT            Referring Provider/Copies To      Primary Care Provider:  Trent Freeman      Copies To:   Trent Freeman            History and Present Illness      Past Oncology Illness History      Ms. Hoang a 60-year-old female who was referred to me for new diagnosis of     lung cancer by Dr. Perry.              She initially presented with chest pain and underwent a chest CT due to concern     for coronavirus.  This chest CT was notable for mediastinal adenopathy up to 3.8    x 5.1 x 5.9 cm.  Patient underwent endobronchial ultrasound on 2020 and     pathology showed large cell neuroendocrine carcinoma. Patient comes in today to     establish care.  She understands that she has a diagnosis of lung cancer but d    oes not understand the specifics.  Currently she is asymptomatic as the chest     pain has resolved.  She denies significant shortness of breath or cough.  She     does have occasional headaches but relates this to recent stress.            2020 endobronchial ultrasound with biopsy: Lymph node, station 4 R+ for     large cell neuroendocrine carcinoma.  Tumor cells are positive for CD56, CK7,     NSE, pancytokeratin (dot-like), synaptophysin, and TTF-1.  Immunostaining was     negative for p40 and CK 5/6.            2020 brain MRI without contrast: 1) study is limited due to lack of     contrast but no definite acute findings.  2) volume loss indicating age-    appropriate atrophy.  3) chronic ischemic changes.            Patient has smoked for many years  even decades.  She states that she is always     been a light smoker and currently smokes less than a half a pack per day.            PET/CT on 5/4/2020: There appears to be a large right paratracheal mass measures    approximately 5.7 cm in maximum dimension and is similar to the prior CT.  The     maximum SUV is 13.2.  The hypermetabolic activity extends into the precarinal     mass.  There is abnormal FDG activity in the left infrahilar region where there     is suspected adenopathy with a maximum SUV of 7.1.  There are no other     definitive findings of malignancy throughout the chest, abdomen, or pelvis.            Cycle 1 of cisplatin and etoposide on 6/8/2020 with cisplatin split over 3 days.      Cycle 2 on 7/6/2020.            HPI - Oncology Interim      Patient comes in today for cycle 2 of chemotherapy.  We discussed the fact that     her creatinine is elevated.  She says that she occasionally has some alcohol and    drinks a lot of soft drinks.  However she also says that she drinks a     significant amount of water and stays hydrated.  She has not noticed any     significant changes in her health and is tolerating chemotherapy very well.  On     further questioning she says she has chronic ringing in her left ear but that is    not any worse after chemotherapy.  She has noticed a little bit of numbness in     one finger of her left hand but no other significant changes.            ECOG Performance Status      0            Most Recent Lab Findings      Laboratory Tests      7/6/20 08:55            PAST, FAMILY   Past Medical History      Past Medical History:  Diabetes Type 2      Hematology/Oncology (F):  Lung Cancer      Genetic/Metabolic:  None            Past Surgical History      Biopsy, Cholecystectomy, Joint Replacement (SHOULDER)            CARPAL TUNNEL X2 RIGHT HAND      TUBAL LIGATION            Family History      Family History:  No Family History            Social History      Marital  Status:        Lives independently:  Yes      Number of Children:  4      Occupation:  HOUSEWIFE            Tobacco Use      Tobacco status:  Light tobacco smoker (ONE PACK PER WEEK)            Alcohol Use      Alcohol intake:  3 TIMES WEEKLY            Substance Use      Substance use:  Marijuana            REVIEW OF SYSTEMS      General:  Admits: Fatigue;          Denies: Appetite Change, Fever, Night Sweats, Weight Gain, Weight Loss      Eye:  Denies Blurred Vision, Denies Corrective Lenses, Denies Diplopia, Denies     Vision Changes      ENT:  Denies Headache, Denies Hearing Loss, Denies Hoarseness, Denies Sore     Throat      Other      Chronic ringing in her left ear      Cardiovascular:  Denies Chest Pain, Denies Palpitations      Respiratory:  Denies: Cough, Coughing Blood, Productive Cough, Shortness of Air,    Wheezing      Gastrointestinal:  Denies Bloody Stools, Denies Constipation, Denies Diarrhea,     Denies Nausea/Vomiting, Denies Problem Swallowing, Denies Unable to Control     Bowels      Genitourinary:  Denies Blood in Urine, Denies Incontinence, Denies Painful     Urination      Musculoskeletal:  Denies Back Pain, Denies Muscle Pain, Denies Painful Joints      Integumentary:  Denies Itching, Denies Lesions, Denies Rash      Neurologic:  Denies Dizziness, Denies Numbness\Tingling, Denies Seizures      Psychiatric:  Denies Anxiety, Denies Depression      Endocrine:  Denies Cold Intolerance, Denies Heat Intolerance      Hematologic/Lymphatic:  Denies Bruising, Denies Bleeding, Denies Enlarged Lymph     Nodes      Reproductive:  Denies: Menopause, Heavy Periods, Pregnant, Still Menstruating            VITAL SIGNS AND SCORES      Vitals      Weight 199 lbs 4.733 oz / 90.4 kg      Temperature 97.4 F / 36.33 C - Temporal      Pulse 83      Respirations 18      Blood Pressure 140/81 Sitting      Pulse Oximetry 97%, ROOM AIR            Pain Score      Experiencing any pain?:  No      Pain Scale  Used:  Numerical      Pain Intensity:  0            Fatigue Score      Experiencing any fatigue?:  Yes      Fatigue (0-10 scale):  4            EXAM      General: Alert, cooperative, no acute distress      Eyes: Anicteric sclera, PERRLA      HEENT: Oropharynx clear, no exudates      Respiratory: CTAB, normal respiratory effort      Abdomen: Normal active bowel sounds, no tenderness, no distention      Cardiovascular: RRR, no murmur, no peripheral edema      Skin: Normal tone, no rash, no lesions      Psychiatric: Appropriate affect, intact judgment      Neurologic: No focal sensory or motor deficits, no weakness, numbness, dizziness      Musculoskeletal: Normal muscle strength, normal muscle tone      Extremities: No clubbing, cyanosis, or deformities            PREVENTION      Hx Influenza Vaccination:  No      Influenza Vaccine Declined:  No      2 or More Falls Past Year?:  No      Fall Past Year with Injury?:  No      Hx Pneumococcal Vaccination:  No      Encouraged to follow-up with:  PCP regarding preventative exams.      Chart initiated by      JANE RENTERIA [facility]            ALLERGY/MEDS      Allergies      Coded Allergies:             OXYCODONE HCL (Verified  Allergy, Unknown, ITCHING- CAN TAKE TYLENOL ,     7/6/20)                  PT STATED SHE CAN TAKE TYLENOL            Medications      Last Reconciled on 7/6/20 17:55 by RUBA DOHERTY      (diuretic)   No Conflict Check               Reported         6/9/20       MDI-Albuterol (Ventolin HFA) 8 Gm Hfa.aer.ad      1 PUFFS INH Q4-6H PRN for SHORTNESS OF BREATH, #1 MDI 3 Refills         Prov: Luana Milian PA-C         5/5/20       Losartan Potassium (Losartan*) 100 Mg Tablet      100 MG PO QDAY, #30 TAB 0 Refills         Reported         4/20/20       metFORMIN HCl (metFORMIN HCl) 500 Mg Tablet      500 MG PO BID, #60 TAB 0 Refills         Reported         4/20/20       metFORMIN HCl (metFORMIN HCl) 1,000 Mg Tablet      1000 MG PO BID for 30 Days, #60 TAB          Reported         4/20/20       Montelukast Sodium (Montelukast*) 10 Mg Tablet      10 MG PO QDAY, TAB         Reported         2/7/19       glyBURIDE (glyBURIDE) 5 Mg Tablet      5 MG PO BID         Reported         3/15/11       Metoprolol Tartrate (Metoprolol Tartrate*) 50 Mg Tablet      50 MG PO BID         Reported         3/9/11      Medications Reviewed:  No Changes made to meds            IMPRESSION/PLAN      Impression      60-year-old female with limited stage small cell lung cancer here for second     cycle of chemotherapy.            Diagnosis      Lung cancer - C34.90            Notes      New Diagnostics      * BMP, 1 DAY         Dx: Lung cancer - C34.90            Plan      Limited stage large cell lung cancer: Patient is here for her second cycle of     cisplatin and etoposide with cisplatin split across 3 days.  She is also getting    concurrent radiation.  She has had an increase in her creatinine which equates     to a GFR of about 30.  Given that her cisplatin is already split across 3 days I    will not make any further dose modifications.  I will plan to add 1 L of normal     saline today and repeat a BNP tomorrow.  If her creatinine remains elevated or     if it worsens then I will plan to switch to carboplatin for subsequent doses.            FRANCISCO: Secondary to dehydration versus chemotherapy.  Will give extra IV fluids     and monitor as above.            Tinnitus: Patient reports chronic ringing in her left ear which is not     substantially worse with chemotherapy.  We will continue to monitor.            Patient Education      Patient Education Provided:  Yes            Electronically signed by RUBA DOHERTY  07/06/2020 17:55       Disclaimer: Converted document may not contain table formatting or lab diagrams. Please see [employer] [facility] for the authenticated document.

## 2021-05-28 NOTE — PROGRESS NOTES
Patient: MERY COLLAZO     Acct: [id number]     Report: #[id number]  UNIT #: [id number]     : 1960    Encounter Date:2020  PRIMARY CARE: Trent Freeman  ***Signed***  --------------------------------------------------------------------------------------------------------------------  TELEHEALTH NOTE      History of Present Illness            Chief Complaint: [facility] F/U            Mery Collazo is presenting for evaluation via Telehealth visit. Verbal     consent obtained before beginning visit.            Provider spent (12) minutes with the patient during telehealth visit.            The following staff were present during the visit: Kelly Little CMA, Luana Milian PA-C            The patient is a 60 year old female who was recently seen by Dr. Hurley and Dr. Perry while hospitalized at [facility]. She had presented on     2020, initially had had nausea, vomiting, diarrhea, confusion, was found to     have a large right paratracheal mass on chest CT and required Levophed in the     ICU.  We were consulted for septic shock and abnormal chest CT.  As she     stabilized, she was able to undergo bronchoscopy with Dr. Hurley and had     endobronchial ultrasound of Station 4R lymph node and endobronchial biopsy from     right mainstem bronchus. 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 The     patient has followed up with Dr. Heart since then, had a PET scan that did not     appear to show any other findings of malignancy other than hypermetabolic right     mediastinal and left hilar lymph nodes.  The patient tells me she has never had     any breathing issues in the past, has never been diagnosed with asthma or COPD.     She did have a Ventolin rescue inhaler at one point which she would use     occasionally a few times a month and it did seem to help. She is  feeling better     since she was discharged from the hospital, but is confused about the different     specialists she is seeing and who is managing what.  She is continuing to smoke.      Some days she does not smoke at all, some days she smokes a few cigarettes.      She had smoked on average one pack per day for 40 plus years. The patient     currently denies exertional dyspnea, coughing or wheezing. She also denies     hemoptysis, fever or chills.              I have reviewed ROS, medical, surgical and family history and agree with those     as entered in the chart.  I reviewed recent hospital records, chest imaging,     procedure notes, pathology, oncology notes.                           Past Med History      HX: Septic Shock, Mediastinal mass, R/O Covid      Current Smoker x40 years, 1 pack per week      Vaccines - Declines all      Overview of Symptoms      Denies: Fever, fatigue, chills, nausea, SOA, cough            Most Recent Lab Findings      Laboratory Tests      4/24/20 05:34            Laboratory Tests            Test       4/24/20      05:34             Magnesium Level       1.99 mg/dL      (1.60-2.30)            Allergies/Medications      Allergies:        Coded Allergies:             OXYCODONE HCL (Verified  Allergy, Unknown, ITCHING- CAN TAKE TYLENOL ,     2/7/19)                  PT STATED SHE CAN TAKE TYLENOL      Medications    Last Reconciled on 5/5/20 13:42 by AISSATOU LAND      Losartan Potassium (Losartan*) 100 Mg Tablet      100 MG PO QDAY, #30 TAB 0 Refills         Reported         4/20/20       metFORMIN HCl (metFORMIN HCl) 500 Mg Tablet      500 MG PO BID, #60 TAB 0 Refills         Reported         4/20/20       metFORMIN HCl (metFORMIN HCl) 1,000 Mg Tablet      1000 MG PO BID for 30 Days, #60 TAB         Reported         4/20/20       Montelukast Sodium (Montelukast*) 10 Mg Tablet      10 MG PO QDAY, TAB         Reported         2/7/19       glyBURIDE (glyBURIDE) 5 Mg Tablet       5 MG PO BID         Reported         3/15/11       Metoprolol Tartrate (Metoprolol Tartrate*) 50 Mg Tablet      50 MG PO BID         Reported         3/9/11            Plan/Instructions      Ambulatory Assessment/Plan:        Tobacco dependence due to cigarettes - F17.210            Notes      New Medications      * MDI-Albuterol (Ventolin HFA) 8 GM HFA.AER.AD: 1 PUFFS INH Q4-6H PRN SHORTNESS       OF BREATH #1      New Diagnostics      * PFT-Comp, PrePost,DLCO,BodyBox, Week         Dx: Tobacco dependence due to cigarettes - F17.210      Plan/Instructions            * Plan Of Care: ()            * Chronic conditions reviewed and taken into consideration for today's treatment       plan.      * Patient instructed to seek medical attention urgently for new or worsening       symptoms.      * Patient was educated/instructed on their diagnosis, treatment and medications       prior to discharge from the clinic today.            ASSESSMENT:       1. Recent septic shock, resolving.      2.  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 Heart.      3. Ongoing tobacco abuse of cigarette with 40 plus pack year smoking history,     trying to quit.            PLAN:      1.  At this time, I have discussed with patient and her  in detail     regarding recent hospitalization and plans and recommendations going forward. I     have discussed recent pathology results, that station 4R lymph node was positive     for malignant cells, specifically metastatic large cell neuroendocrine     carcinoma.  She is following with Dr. Heart for this and sounds like she is due     to follow up with her later this week for plans for chemoradiation, etc.  I     have instructed patient to continue following with Dr. Heart as scheduled.      They do have questions about what apparently was a referral to see Dr. Thurman     and I have advised them to follow up with Dr. Heart about this.        2. I have  prescribed a Ventolin rescue in [place] for her to use for shortness of     breath, coughing or wheezing.  I have recommended doing baseline PFTs, but as     these are on hold right now given COVID19 pandemic, they will need to scheduled     at a later date when feasible.        3. I counseled the patient on smoking cessation for 4 minutes, offered nicotine     replacement therapy and  pharmacotherapy and she declines.        4. I will have her follow up in three months with Dr. Hurley, sooner if needed.      Codes:  Phone Eval 11-20 mi [id number]            Electronically signed by LYNN PAREDES PA-C  05/21/2020 08:22       Disclaimer: Converted document may not contain table formatting or lab diagrams. Please see [employer] for the authenticated document.

## 2021-05-28 NOTE — PROGRESS NOTES
Patient: JULIA HOANG     Acct: [id number]     Report: #[id number]  UNIT #: [id number]     : 1960    Encounter Date:2020  PRIMARY CARE: Trent Freeman  ***Signed***  --------------------------------------------------------------------------------------------------------------------  NURSE INTAKE      Visit Type      Established Patient Visit            Chief Complaint      LUNG CANCER            Referring Provider/Copies To      Primary Care Provider:  Trent Freeman      Copies To:   Trent Freeman            History and Present Illness      Past Oncology Illness History      Ms. Hoang a 60-year-old female who was referred to me for new diagnosis of     lung cancer by Dr. Perry.              She initially presented with chest pain and underwent a chest CT due to concern     for coronavirus.  This chest CT was notable for mediastinal adenopathy up to 3.8    x 5.1 x 5.9 cm.  Patient underwent endobronchial ultrasound on 2020 and     pathology showed large cell neuroendocrine carcinoma. Patient comes in today to     establish care.  She understands that she has a diagnosis of lung cancer but     does not understand the specifics.  Currently she is asymptomatic as the chest     pain has resolved.  She denies significant shortness of breath or cough.  She     does have occasional headaches but relates this to recent stress.            2020 endobronchial ultrasound with biopsy: Lymph node, station 4 R+ for     metastatic large cell neuroendocrine carcinoma.  Tumor cells are positive for     CD56, CK7, NSE, pancytokeratin (dot-like), synaptophysin, and TTF-1.      Immunostaining was negative for p40 and CK 5/6.      2020 brain MRI without contrast: 1) study is limited due to lack of     contrast but no definite acute findings.  2) volume loss indicating age-appro    priate atrophy.  3) chronic ischemic changes.            Patient has smoked for many years even  decades.  She states that she is always     been a light smoker and currently smokes less than a half a pack per day.            PET/CT on 5/4/2020: There appears to be a large right paratracheal mass measures    approximately 5.7 cm in maximum dimension and is similar to the prior CT.  The     maximum SUV is 13.2.  The hypermetabolic activity extends into the precarinal     mass.  There is abnormal FDG activity in the left infrahilar region where there     is suspected adenopathy with a maximum SUV of 7.1.  There are no other     definitive findings of malignancy throughout the chest, abdomen, or pelvis.            HPI - Oncology Interim      Patient states she had an appointment with radiation oncology but she did not go    because they would not let her  into the hospital for the first visit due    to coronavirus.  She says she has significant anxiety with doctor's appointments    and would like him to be there.  I did discuss with a radiation oncologist and     Ivon about allowing him to visit if she wishes to be treated there.      However, the patient agreed in the end to return back to Dr. Thurman at [facility] due to the close distance and the need for multiple days of treatment.     Currently her biggest complaints are fatigue and right shoulder pain which may     be due to the malignancy but she otherwise feels well.  She still cannot believe    that she has a diagnosis of a serious cancer.            We reviewed the results of the PET scan which show she has limited stage     disease.  I discussed the risks and benefits to chemotherapy and radiation.  She    is agreeable to treatment            ECOG Performance Status      0            PAST, FAMILY   Past Medical History      Past Medical History:  Diabetes Type 2      Hematology/Oncology (F):  Lung Cancer      Genetic/Metabolic:  None            Past Surgical History      Biopsy, Cholecystectomy, Joint Replacement (SHOULDER)             CARPAL TUNNEL X2 RIGHT HAND      TUBAL LIGATION            Family History      Family History:  No Family History            Social History      Marital Status:        Lives independently:  Yes      Number of Children:  4      Occupation:  HOUSEWIFE            Tobacco Use      Tobacco status:  Light tobacco smoker (ONE PACK PER WEEK)            Alcohol Use      Alcohol intake:  3 TIMES WEEKLY            Substance Use      Substance use:  Marijuana            REVIEW OF SYSTEMS      General:  Admits: Fatigue;          Denies: Appetite Change, Fever, Night Sweats, Weight Gain, Weight Loss      Eye:  Denies Blurred Vision, Denies Corrective Lenses, Denies Diplopia, Denies     Vision Changes      ENT:  Denies Headache, Denies Hearing Loss, Denies Hoarseness, Denies Sore     Throat      Other      food doesn't digest; feels like a knot in her throat      Cardiovascular:  Denies Chest Pain, Denies Palpitations      Respiratory:  Denies: Cough, Coughing Blood, Productive Cough, Shortness of Air,    Wheezing      Gastrointestinal:  Denies Bloody Stools, Denies Constipation, Denies Diarrhea,     Denies Nausea/Vomiting, Denies Problem Swallowing, Denies Unable to Control     Bowels      Genitourinary:  Denies Blood in Urine, Denies Incontinence, Denies Painful     Urination      Musculoskeletal:  Denies Back Pain, Denies Muscle Pain; Admits Painful Joints     (hands)      Integumentary:  Denies Itching, Denies Lesions, Denies Rash      Neurologic:  Denies Dizziness, Denies Numbness\Tingling, Denies Seizures      Psychiatric:  Denies Anxiety, Denies Depression      Endocrine:  Denies Cold Intolerance, Denies Heat Intolerance      Hematologic/Lymphatic:  Denies Bruising, Denies Bleeding, Denies Enlarged Lymph     Nodes      Reproductive:  Denies: Menopause, Heavy Periods, Pregnant, Still Menstruating            VITAL SIGNS AND SCORES      Vitals      Height 5 ft 5.75 in / 167 cm      Weight 195 lbs 8.768 oz / 88.7 kg       BSA 1.98 m2      BMI 31.8 kg/m2      Temperature 96.1 F / 35.61 C - Temporal      Pulse 76      Blood Pressure 154/93 Sitting, Left Arm      Pulse Oximetry 100%, room air            Pain Score      Experiencing any pain?:  Yes      Pain Scale Used:  Numerical      Pain Intensity:  7            Fatigue Score      Experiencing any fatigue?:  Yes      Fatigue (0-10 scale):  5            EXAM      General: Alert, cooperative, no acute distress      Eyes: Anicteric sclera, PERRLA      HEENT: Oropharynx clear, no exudates      Respiratory: CTAB, normal respiratory effort      Abdomen: Normal active bowel sounds, no tenderness, no distention      Cardiovascular: RRR, no murmur, no peripheral edema      Skin: Normal tone, no rash, no lesions      Psychiatric: Appropriate affect, intact judgment      Neurologic: No focal sensory or motor deficits, no weakness, numbness, dizziness      Musculoskeletal: Normal muscle strength, normal muscle tone      Extremities: No clubbing, cyanosis, or deformities            PREVENTION      Hx Influenza Vaccination:  No      Influenza Vaccine Declined:  No      2 or More Falls Past Year?:  No      Fall Past Year with Injury?:  No      Hx Pneumococcal Vaccination:  No      Encouraged to follow-up with:  PCP regarding preventative exams.      Chart initiated by      SANDIP SALAZAR CMA            ALLERGY/MEDS      Allergies      Coded Allergies:             OXYCODONE HCL (Verified  Allergy, Unknown, ITCHING- CAN TAKE TYLENOL ,     5/12/20)                  PT STATED SHE CAN TAKE TYLENOL            Medications      Last Reconciled on 5/13/20 16:42 by RUBA DOHERTY MDI-Albuterol (Ventolin HFA) 8 Gm Hfa.aer.ad      1 PUFFS INH Q4-6H PRN for SHORTNESS OF BREATH, #1 MDI 3 Refills         Prov: Luana Milian PA-C         5/5/20       Losartan Potassium (Losartan*) 100 Mg Tablet      100 MG PO QDAY, #30 TAB 0 Refills         Reported         4/20/20       metFORMIN HCl (metFORMIN HCl) 500 Mg  Tablet      500 MG PO BID, #60 TAB 0 Refills         Reported         4/20/20       metFORMIN HCl (metFORMIN HCl) 1,000 Mg Tablet      1000 MG PO BID for 30 Days, #60 TAB         Reported         4/20/20       Montelukast Sodium (Montelukast*) 10 Mg Tablet      10 MG PO QDAY, TAB         Reported         2/7/19       glyBURIDE (glyBURIDE) 5 Mg Tablet      5 MG PO BID         Reported         3/15/11       Metoprolol Tartrate (Metoprolol Tartrate*) 50 Mg Tablet      50 MG PO BID         Reported         3/9/11      Medications Reviewed:  No Changes made to meds            IMPRESSION/PLAN      Impression      60-year-old female with a new diagnosis of limited stage small cell lung cancer            Diagnosis      Lung cancer - C34.90            Notes      New Referrals      * Surgery, As Soon As Possible         SONIA HEALY MD with [facility]         Reason for Referral: port placement for chemo         Dx: Lung cancer - C34.90      * Radiation Therapy, As Soon As Possible         MAURO MORROW with RADIATION ONCOLOGY         Dx: Lung cancer - C34.90            Plan      Small cell versus large cell lung cancer: Pathology revealed this is a large     cell lung cancer although that is a very rare diagnosis and is difficult to     distinguish from small cell.  Regardless, the treatment is essentially the same.     I discussed treatment including cisplatin and etoposide with concurrent     radiation with the patient and her .  She is agreeable to return to     radiation oncology with Dr. Morrow to discuss that aspect of care.  I explained     that due to her chronic kidney disease we will likely give the cisplatin as well    as etoposide over 3 days.  She will first need placement of a port which she is     also agreeable to.            Macrocytic anemia: Patient has unexplained macrocytic anemia.  I would not get     new labs today but when she returns for her first cycle of chemotherapy I  plan     to check B12 and folate.  She should also have repeat TSH.            CKD: Patient reports seeing [place] nephrologist [place] [address] in the past.  She knows     that he did a large work-up but does not know what the ultimate conclusion was.     She is on losartan for high blood pressure.  Due to the degree of her kidney     failure            Patient Education      Patient Education Provided:  Yes            Electronically signed by RUBA DOHERTY  05/13/2020 16:42       Disclaimer: Converted document may not contain table formatting or lab diagrams. Please see [employer] for the authenticated document.

## 2021-05-28 NOTE — PROGRESS NOTES
Patient: JULIA HOANG     Acct: [id number]     Report: #[id number]  UNIT #: [id number]     : 1960    Encounter Date:03/15/2021  PRIMARY CARE: Trent Freeman  ***Signed***  --------------------------------------------------------------------------------------------------------------------  NURSE INTAKE      Visit Type      Established Patient Visit            Chief Complaint      LUNG CA            Referring Provider/Copies To      Referring Provider:  Trent Freeman      Primary Care Provider:  Trent Freeman      Copies To:   Trent Freeman            History and Present Illness      Past Oncology Illness History      Ms. Hoang a 60-year-old female who was referred to me for new diagnosis of     lung cancer by Dr. Perry.              She initially presented with chest pain and underwent a chest CT due to concern     for coronavirus.  This chest CT was notable for mediastinal adenopathy up to 3.8    x 5.1 x 5.9 cm.  Patient underwent endobronchial ultrasound on 2020 and     pathology showed large cell neuroendocrine carcinoma. Patient comes in today to     establish care.  She understands that she has a diagnosis of lung cancer but     does not understand the specifics.  Currently she is asymptomatic as the chest     pain has resolved.  She denies significant shortness of breath or cough.  She     does have occasional headaches but relates this to recent stress.            2020 endobronchial ultrasound with biopsy: Lymph node, station 4 R+ for     large cell neuroendocrine carcinoma.  Tumor cells are positive for CD56, CK7,     NSE, pancytokeratin (dot-like), synaptophysin, and TTF-1.  Immunostaining was     negative for p40 and CK 5/6.            2020 brain MRI without contrast: 1) study is limited due to lack of     contrast but no definite acute findings.  2) volume loss indicating age-    appropriate atrophy.  3) chronic ischemic changes.             Patient has smoked for many years even decades.  She states that she is always     been a light smoker and currently smokes less than a half a pack per day.            PET/CT on 5/4/2020: There appears to be a large right paratracheal mass measures    approximately 5.7 cm in maximum dimension and is similar to the prior CT.  The     maximum SUV is 13.2.  The hypermetabolic activity extends into the precarinal     mass.  There is abnormal FDG activity in the left infrahilar region where there     is suspected adenopathy with a maximum SUV of 7.1.  There are no other     definitive findings of malignancy throughout the chest, abdomen, or pelvis.            Cycle 1 of cisplatin and etoposide on 6/8/2020 with cisplatin split over 3 days.      Cycle 2 on 7/6/2020.  Cycle 3 on 7/27/2020. Cycle 4 on 8/17/2020            PET/CT on 10/9/20: No abnormal hypermetabolic activity.  Significant improvement    in mediastinal and left hilar adenopathy.            CT CAP on 1/7/2021 shows enlarged mediastinal lymph nodes concerning for rec    urrence.            Brain MRI on 1/28/2021 is negative for CNS involvement            Bronchoscopy and biopsy on 3/8/21: negative for malignancy            HPI - Oncology Interim      The patient comes in with her  to discuss the plan of care going forward.     I was happy to see that her biopsy was negative.  I explained the results to     the patient.              She told me that she was went to the ER about 2 weeks ago for a low heart rate.     She was unsure but believes it was due to medications.  She feels well today.            ECOG Performance Status      1            Most Recent Lab Findings      Laboratory Tests      3/2/21 05:08            3/3/21 05:15            Laboratory Tests            Test       3/3/21      05:15             Magnesium Level       2.76 mg/dL      (1.60-2.30)            PAST, FAMILY   Past Medical History      Past Medical History:  Diabetes Type 2       Hematology/Oncology (F):  Lung Cancer      Genetic/Metabolic:  None            Past Surgical History      Biopsy, Cholecystectomy, Joint Replacement            CARPAL TUNNEL X2 RIGHT HAND      TUBAL LIGATION            Family History      Family History:  No Family History            Social History      Lives independently:  Yes      Number of Children:  4      Occupation:  HOUSEWIFE            Tobacco Use      Tobacco status:  Former smoker      Quit status:  Quit date established            Substance Use      Substance use:  Marijuana            REVIEW OF SYSTEMS      General:  Denies: Appetite Change, Fatigue, Fever, Night Sweats, Weight Gain,     Weight Loss      Eye:  Denies Blurred Vision, Denies Corrective Lenses, Denies Diplopia, Denies     Vision Changes      ENT:  Denies Headache, Denies Hearing Loss, Denies Hoarseness, Denies Sore     Throat      Cardiovascular:  Denies Chest Pain, Denies Palpitations      Respiratory:  Denies: Cough, Coughing Blood, Productive Cough, Shortness of Air,    Wheezing      Gastrointestinal:  Denies Bloody Stools, Denies Constipation, Denies Diarrhea,     Denies Nausea/Vomiting, Denies Problem Swallowing, Denies Unable to Control     Bowels      Genitourinary:  Denies Blood in Urine, Denies Incontinence, Denies Painful     Urination      Musculoskeletal:  Denies Back Pain, Denies Muscle Pain, Denies Painful Joints      Integumentary:  Denies Itching, Denies Lesions, Denies Rash      Neurologic:  Denies Dizziness, Denies Numbness\Tingling, Denies Seizures      Psychiatric:  Denies Anxiety, Denies Depression      Endocrine:  Denies Cold Intolerance, Denies Heat Intolerance      Hematologic/Lymphatic:  Denies Bruising, Denies Bleeding, Denies Enlarged Lymph     Nodes      Reproductive:  Denies: Menopause, Heavy Periods, Pregnant, Still Menstruating            VITAL SIGNS AND SCORES      Vitals      Weight 184 lbs 4.873 oz / 83.6 kg      Temperature 97.2 F / 36.22 C - Temporal       Pulse 53      Respirations 18      Blood Pressure 138/69 Sitting, Left Arm      Pulse Oximetry 99%, RM AIR            Pain Score      Experiencing any pain?:  No      Pain Scale Used:  Numerical      Pain Intensity:  0            Fatigue Score      Experiencing any fatigue?:  No      Fatigue (0-10 scale):  0 (none)            EXAM      General: Alert, cooperative, no acute distress      Eyes: Anicteric sclera, PERRLA      Respiratory: CTAB, normal respiratory effort      Abdomen: Normal active bowel sounds, no tenderness, no distention      Cardiovascular: RRR, no murmur, no lower extremity edema      Skin: Normal tone, no rash, no lesions      Psychiatric: Appropriate affect, intact judgment      Neurologic: No focal sensory or motor deficits, no weakness, numbness, dizziness      Musculoskeletal: Normal muscle strength and tone      Extremities: No clubbing, cyanosis, or deformities            PREVENTION      Hx Influenza Vaccination:  No      Influenza Vaccine Declined:  Yes      2 or More Falls in Past Year?:  No      Fall Past Year with Injury?:  No      Hx Pneumococcal Vaccination:  No      Encouraged to follow-up with:  PCP regarding preventative exams.      Chart initiated by      JUANITO VILLALOBOS MA            ALLERGY/MEDS      Allergies      Coded Allergies:             OXYCODONE HCL (Verified  Allergy, Unknown, ITCHING- CAN TAKE TYLENOL ,     3/5/21)                  PT STATED SHE CAN TAKE TYLENOL            Medications      Last Reconciled on 3/15/21 21:58 by RUBA DOHERTY      Apixaban (Eliquis) 5 Mg Tablet      5 MG PO BID for 30 Days, #60 TAB         Prov: Juanita Carcamo         3/3/21       Blood-Glucose Meter (GLUCOMETER) 1 Each Kit      EACH XX ONCE, #1 0 Refills         Prov: Juanita Carcamo         3/3/21       Insulin Human Aspart (novoLOG FLEXPEN) 100 Unit/1 Ml Insuln.pen      5 UNITS SUBQ AC, #1 BOX 0 Refills         Prov: Juanita Carcamo         3/3/21       Insulin Glargine,Hum.rec.anlog  (Basaglar Kwikpen U-100) 100 Unit/1 Ml Insuln.pen      15 UNITS SUBQ QDAY, #1 BOX         Prov: Juanita Carcamo         3/3/21       Furosemide (Furosemide) 40 Mg Tablet      40 MG PO QDAY for 30 Days, #30 TAB         Prov: Juanita Carcamo         3/3/21       Aspirin EC (Aspirin EC) 81 Mg Tablet.dr      81 MG PO QDAY@08 for 30 Days, #30 TAB.SR         Prov: Juanita Carcamo         3/3/21       Metoprolol Succinate (Metoprolol Succinate) 50 Mg Tab.er.24h      50 MG PO BID for 30 Days, #60 TAB.ER         Prov: Juanita Carcamo         3/3/21       Amiodarone HCl (Pacerone*) 200 Mg Tablet      400 MG PO TID for 30 Days, #60 TAB         Prov: Juanita Carcamo         3/3/21       MDI-Albuterol (Ventolin HFA) 8 Gm Hfa.aer.ad      1 PUFFS INH Q4-6H PRN for SHORTNESS OF BREATH, #1 MDI 3 Refills         Prov: RUBA DOHERTY         2/18/21       Fluticasone/Salmeterol (Airduo Respiclick 113-14 Mcg) 1 Each Aer.pow.ba      1 PUFF INH RTBID, #1 AER.POW.BA 5 Refills         Prov: ADRY BARKER         2/10/21       Montelukast Sodium (Montelukast*) 10 Mg Tablet      10 MG PO QDAY, TAB         Reported         2/7/19      Medications Reviewed:  No Changes made to meds            IMPRESSION/PLAN      Diagnosis      Small cell lung cancer - C34.90            Notes      New Diagnostics      * CBC With Auto Diff, Routine         Dx: Small cell lung cancer - C34.90      * Comp Metabolic Panel, Routine         Dx: Small cell lung cancer - C34.90      * CBC With Auto Diff, 2 Months         Dx: Small cell lung cancer - C34.90      * Comp Metabolic Panel, 2 Months         Dx: Small cell lung cancer - C34.90      New Office Procedures      * VAD Maintenance, Routine         Dx: Small cell lung cancer - C34.90            Plan      * Large cell NET of the lung: Patient was treated with concurrent chemoradiation      with carboplatin and etoposide. Follow-up PET scan showed improvement in       disease.  She did not undergo PCI.  CT scan on  1/7/2020 showed worsening       mediastinal adenopathy.  Fortunately, bronch and biopsy was negative for       recurrence.  Will plan for PET/CT in 3 months to follow-up adenopathy. Today       she will have a port flush. Will check CBC and CMP.            Patient Education      Patient Education Provided:  Yes            Electronically signed by RUBA DOHERTY  03/15/2021 21:58       Disclaimer: Converted document may not contain table formatting or lab diagrams. Please see [facility] for the authenticated document.

## 2021-05-28 NOTE — PROGRESS NOTES
Patient: JULIA HOANG     Acct: [id number]     Report: #[id number]  UNIT #: [id number]     : 1960    Encounter Date:2021  PRIMARY CARE: Trent Freeman  ***Signed***  --------------------------------------------------------------------------------------------------------------------  NURSE INTAKE      Visit Type      Established Patient Visit            Chief Complaint      LUNG CA            Referring Provider/Copies To      Primary Care Provider:  Trent Freeman      Copies To:   Trent Freeman            History and Present Illness      Past Oncology Illness History      Ms. Hoang a 60-year-old female who was referred to me for new diagnosis of     lung cancer by Dr. Perry.              She initially presented with chest pain and underwent a chest CT due to concern     for coronavirus.  This chest CT was notable for mediastinal adenopathy up to 3.8    x 5.1 x 5.9 cm.  Patient underwent endobronchial ultrasound on 2020 and     pathology showed large cell neuroendocrine carcinoma. Patient comes in today to     establish care.  She understands that she has a diagnosis of lung cancer but     does not understand the specifics.  Currently she is asymptomatic as the chest     pain has resolved.  She denies significant shortness of breath or cough.  She     does have occasional headaches but relates this to recent stress.            2020 endobronchial ultrasound with biopsy: Lymph node, station 4 R+ for     large cell neuroendocrine carcinoma.  Tumor cells are positive for CD56, CK7,     NSE, pancytokeratin (dot-like), synaptophysin, and TTF-1.  Immunostaining was     negative for p40 and CK 5/6.            2020 brain MRI without contrast: 1) study is limited due to lack of     contrast but no definite acute findings.  2) volume loss indicating age-    appropriate atrophy.  3) chronic ischemic changes.            Patient has smoked for many years even decades.   She states that she is always     been a light smoker and currently smokes less than a half a pack per day.            PET/CT on 5/4/2020: There appears to be a large right paratracheal mass measures    approximately 5.7 cm in maximum dimension and is similar to the prior CT.  The     maximum SUV is 13.2.  The hypermetabolic activity extends into the precarinal     mass.  There is abnormal FDG activity in the left infrahilar region where there     is suspected adenopathy with a maximum SUV of 7.1.  There are no other     definitive findings of malignancy throughout the chest, abdomen, or pelvis.            Cycle 1 of cisplatin and etoposide on 6/8/2020 with cisplatin split over 3 days.      Cycle 2 on 7/6/2020.  Cycle 3 on 7/27/2020. Cycle 4 on 8/17/2020            PET/CT on 10/9/20: No abnormal hypermetabolic activity.  Significant improvement    in mediastinal and left hilar adenopathy.            HPI - Oncology Interim      Patient comes in today with her  2 discuss her recent CT scan.  She     complains of bilateral breast pain that is not located in any 1 specific     location.  She also says she continues have left upper quadrant abdominal pain.     She says this is gotten more severe and is often a 10 out of 10.  However, she     looks like she is doing well today.  Her weight appears stable although the     patient reports she has lost about 8 pounds since last appointment.  She also     continues have a cough at night but this is not particularly productive.  She     does not have any significant headaches and denies any other neurologic     symptoms.            CT CAP on 1/7/2021 shows evidence of posttreatment changes bilaterally as well     as multiple enlarging mediastinal lymph nodes consistent with progression of     disease.  There is a precarinal lymph node that is 2.3 cm in size.  She has no     other evidence of distant metastatic disease.            ECOG Performance Status      1             Most Recent Lab Findings      Laboratory Tests      1/7/21 12:50            PAST, FAMILY   Past Medical History      Past Medical History:  Diabetes Type 2      Hematology/Oncology (F):  Lung Cancer      Genetic/Metabolic:  None            Past Surgical History      Biopsy, Cholecystectomy, Joint Replacement            CARPAL TUNNEL X2 RIGHT HAND      TUBAL LIGATION            Family History      Family History:  No Family History            Social History      Lives independently:  Yes      Number of Children:  4      Occupation:  HOUSEWIFE            Tobacco Use      Tobacco status:  Light tobacco smoker            Substance Use      Substance use:  Marijuana            REVIEW OF SYSTEMS      General:  Admits: Weight Loss;          Denies: Appetite Change, Fatigue, Fever, Night Sweats, Weight Gain      Eye:  Admits Corrective Lenses; Denies Blurred Vision, Denies Diplopia, Denies     Vision Changes      ENT:  Denies Headache, Denies Hearing Loss, Denies Hoarseness, Denies Sore     Throat      Cardiovascular:  Denies Chest Pain, Denies Palpitations      Respiratory:  Admits: Cough;          Denies: Coughing Blood, Productive Cough, Shortness of Air, Wheezing      Gastrointestinal:  Denies Bloody Stools, Denies Constipation, Denies Diarrhea,     Denies Nausea/Vomiting, Denies Problem Swallowing, Denies Unable to Control     Bowels      Other      Left upper quadrant abdominal pain      Genitourinary:  Denies Blood in Urine, Denies Incontinence, Denies Painful     Urination      Musculoskeletal:  Denies Back Pain, Denies Muscle Pain, Denies Painful Joints      Integumentary:  Denies Itching, Denies Lesions, Denies Rash      Neurologic:  Denies Dizziness, Denies Numbness\Tingling, Denies Seizures      Psychiatric:  Denies Anxiety, Denies Depression      Endocrine:  Denies Cold Intolerance, Denies Heat Intolerance      Hematologic/Lymphatic:  Denies Bruising, Denies Bleeding, Denies Enlarged Lymph     Nodes       Reproductive:  Denies: Menopause, Heavy Periods, Pregnant, Still Menstruating            VITAL SIGNS AND SCORES      Vitals      Weight 181 lbs 10.545 oz / 82.4 kg      Temperature 97.2 F / 36.22 C - Temporal      Pulse 90      Respirations 18      Blood Pressure 136/86 Sitting, Right Arm      Pulse Oximetry 97%, RM AIR            Pain Score      Experiencing any pain?:  No      Pain Scale Used:  Numerical      Pain Intensity:  0            Fatigue Score      Experiencing any fatigue?:  No      Fatigue (0-10 scale):  0 (none)            EXAM      General: Alert, cooperative, no acute distress, well appearing      Eyes: Anicteric sclera, PERRLA      Respiratory: CTAB, normal respiratory effort      Abdomen: Normal active bowel sounds, no tenderness on palpation, no distention      Cardiovascular: RRR, no murmur, no lower extremity edema      Skin: Normal tone, no rash, no lesions      Psychiatric: Appropriate affect, intact judgment      Neurologic: No focal sensory or motor deficits, no weakness, numbness, dizziness      Musculoskeletal: Normal muscle strength and tone      Extremities: No clubbing, cyanosis, or deformities            PREVENTION      Hx Influenza Vaccination:  No      Influenza Vaccine Declined:  Yes      2 or More Falls in Past Year?:  No      Fall Past Year with Injury?:  No      Hx Pneumococcal Vaccination:  No      Encouraged to follow-up with:  PCP regarding preventative exams.      Chart initiated by      TIMO MARTINEZ MA            ALLERGY/MEDS      Allergies      Coded Allergies:             OXYCODONE HCL (Verified  Allergy, Unknown, ITCHING- CAN TAKE TYLENOL ,     1/12/21)                  PT STATED SHE CAN TAKE TYLENOL            Medications      Last Reconciled on 1/12/21 17:34 by RUBA DOHERTY      hydrOXYzine HCL (hydrOXYzine HCL) 25 Mg Tablet      25 MG PO HS, #60 TAB 3 Refills         Prov: RUBA DOHERTY         7/27/20       (diuretic)   No Conflict Check               Reported          6/9/20       MDI-Albuterol (Ventolin HFA) 8 Gm Hfa.aer.ad      1 PUFFS INH Q4-6H PRN for SHORTNESS OF BREATH, #1 MDI 3 Refills         Prov: LYNN PAREDES PA-C         5/5/20       Losartan Potassium (Losartan*) 100 Mg Tablet      100 MG PO QDAY, #30 TAB 0 Refills         Reported         4/20/20       metFORMIN HCl (metFORMIN HCl) 500 Mg Tablet      500 MG PO BID, #60 TAB 0 Refills         Reported         4/20/20       Metformin HCl (Metformin HCl) 1,000 Mg Tablet      1000 MG PO BID for 30 Days, #60 TAB         Reported         4/20/20       Montelukast Sodium (Montelukast*) 10 Mg Tablet      10 MG PO QDAY, TAB         Reported         2/7/19       glyBURIDE (glyBURIDE) 5 Mg Tablet      5 MG PO BID         Reported         3/15/11       Metoprolol Tartrate (Metoprolol Tartrate*) 50 Mg Tablet      50 MG PO BID         Reported         3/9/11      Medications Reviewed:  No Changes made to meds            IMPRESSION/PLAN      Diagnosis      Small cell lung cancer - C34.90            Abnormal CT scan of lung - R91.8            Notes      New Referrals      * Pulmonology Consult, Routine         [facility]         Reason for Referral: needs bronch and biopsy of mediastinal lymph node.         Dx: Small cell lung cancer - C34.90            Plan      Limited stage small cell lung cancer: Patient was initially treated with     concurrent chemoradiation.  Follow-up PET scan showed improvement in disease.      She did not undergo PCI.  She returns the clinic today after her most recent CT     scan on 1/7/2020.  Unfortunate this is concerning for progression of disease in     the mediastinal lymph nodes.  I discussed this result with the patient and her     .  She is agreeable 2 a referral back 2 pulmonology for consideration of     bronchoscopy and biopsy.  I discussed her case with Dr. Thurman in radiation     oncology and he recommends a brain MRI as well for complete restaging.            Chronic cough:  Patient's cough is not significantly changed.  She does have     shortness of breath intermittently and says that she needs a refill on her     breathing medications.  She was not able clarify which specific medication she     needed refills on.  Since she has some remaining I asked her do follow-up with     pulmonology regarding this issue as well as her possible biopsy.            Patient Education      Patient Education Provided:  Yes            Electronically signed by RUBA DOHERTY  01/12/2021 17:34       Disclaimer: Converted document may not contain table formatting or lab diagrams. Please see [facility] for the authenticated document.

## 2021-05-28 NOTE — PROGRESS NOTES
Patient: JULIA COLLAZO     Acct: [id number]     Report: #[id number]  UNIT #: [id number]     : 1960    Encounter Date:02/10/2021  PRIMARY CARE: Trent Freeman  ***Signed***  --------------------------------------------------------------------------------------------------------------------  Chief Complaint      Encounter Date      Feb 10, 2021            Primary Care Provider      Trent Freeman            Referring Provider      RUBA DOHERTY            Patient Complaint      Patient is complaining of      New pt here for possible bronch/bx            VITALS      Height 5 ft 7 in / 170.18 cm      Weight 181 lbs 2 oz / 82.572172 kg      BSA 1.94 m2      BMI 28.4 kg/m2      Temperature 97.0 F / 36.11 C - Temporal      Pulse 56      Respirations 16      Blood Pressure 122/63 Sitting, Left Arm      Pulse Oximetry 94%, Room air            HPI      Ms. Collazo a 60-year-old female who had a bronchoscopy with EBUS and found to     be having small cell lung carcinoma and the procedure was done by Dr. Perry.            She initially presented with chest pain and underwent a chest CT due to concern     for coronavirus.  This chest CT was notable for mediastinal adenopathy up to 3.8    x 5.1 x 5.9 cm.  Patient underwent endobronchial ultrasound on 2020 and     pathology showed large cell neuroendocrine carcinoma.  Patient was seen by     oncology service and radiation oncologist for further treatment as noted below.            2020 endobronchial ultrasound with biopsy: Lymph node, station 4 R+ for     large cell neuroendocrine carcinoma.  Tumor cells are positive for CD56, CK7,     NSE, pancytokeratin (dot-like), synaptophysin, and TTF-1.  Immunostaining was     negative for p40 and CK 5/6.            2020 brain MRI without contrast: 1) study is limited due to lack of     contrast but no definite acute findings.  2) volume loss indicating age-    appropriate atrophy.  3)  chronic ischemic changes.            Patient has smoked for many years even decades.  She states that she is always     been a light smoker and currently smokes less than a half a pack per day.            PET/CT on 5/4/2020: There appears to be a large right paratracheal mass measures    approximately 5.7 cm in maximum dimension and is similar to the prior CT.  The     maximum SUV is 13.2.  The hypermetabolic activity extends into the precarinal     mass.  There is abnormal FDG activity in the left infrahilar region where there     is suspected adenopathy with a maximum SUV of 7.1.  There are no other     definitive findings of malignancy throughout the chest, abdomen, or pelvis.            Cycle 1 of cisplatin and etoposide on 6/8/2020 with cisplatin split over 3 days.      Cycle 2 on 7/6/2020.  Cycle 3 on 7/27/2020. Cycle 4 on 8/17/2020            PET/CT on 10/9/20: No abnormal hypermetabolic activity.  Significant improvement    in mediastinal and left hilar adenopathy.            Patient had the CT scan of the chest done on January 7, 2021 and fortunately     that shows progression of the disease.  Oncologist discussed with the patient     and the the family and recommended to get the repeat bronchoscopy with possible     EBUS and biopsy to check for the recurrence hence the patient is here.      Patient continues to cough with the worsening of the shortness of breath and the    cough and also left upper quadrant pain below the breast and she has this for     the past few weeks.  She continues to smoke but significantly less.      Other medical problems, medications, social history and family history is     reviewed and as documented.      Patient does admit that he has been having the cough and sometimes productive     sputum along with the shortness of breath.            ROS      Constitutional:  Denies: Fatigue, Fever, Weight gain, Weight loss, Chills,     Insomnia, Other      Respiratory/Breathing:   Complains of: Shortness of air, Cough; Denies: Wheezing,    Hemoptysis, Pleuritic pain, Other      Endocrine:  Denies: Polydipsia, Polyuria, Heat/cold intolerance, Abnorml     menstrual pattern, Diabetes, Other      Eyes:  Denies: Blurred vision, Vision Changes, Other      Ears, nose, mouth, throat:  Denies: Mouth lesions, Thrush, Throat pain,     Hoarseness, Allergies/Hay Fever, Post Nasal Drip, Headaches, Recent Head Injury,    Nose Bleeding, Neck Stiffness, Thyroid Mass, Hearing Loss, Ear Fullness, Dry     Mouth, Nasal or Sinus Pain, Dry Lips, Nasal discharge, Nasal congestion, Other      Cardiovascular:  Denies: Palpitations, Syncope, Claudication, Chest Pain, Wake     up Gasping for air, Leg Swelling, Irregular Heart Rate, Cyanosis, Dyspnea on     Exertion, Other      Gastrointestinal:  Denies: Nausea, Constipation, Diarrhea, Abdominal pain,     Vomiting, Difficulty Swallowing, Reflux/Heartburn, Dysphagia, Jaundice,     Bloating, Melena, Bloody stools, Other      Genitourinary:  Denies: Urinary frequency, Incontinence, Hematuria, Urgency, No    cturia, Dysuria, Testicular problems, Other      Musculoskeletal:  Denies: Joint Pain, Joint Stiffness, Joint Swelling, Myalgias,    Other      Hematologic/lymphatic:  DENIES: Lymphadenopathy, Bruising, Bleeding tendencies,     Other      Neurological:  Denies: Headache, Numbness, Weakness, Seizures, Other      Psychiatric:  Denies: Anxiety, Appropriate Effect, Depression, Other      Sleep:  No: Excessive daytime sleep, Morning Headache?, Snoring, Insomnia?, Stop    breathing at sleep?, Other      Integumentary:  Denies: Rash, Dry skin, Skin Warm to Touch, Other      Immunologic/Allergic:  Denies: Latex allergy, Seasonal allergies, Asthma,     Urticaria, Eczema, Other      Immunization status:  No: Up to date            FAMILY/SOCIAL/MEDICAL HX      Surgical History:  Yes: Bladder Surgery (KIDNEY STONE REMOVAL), Cholecystectomy,    Orthopedic Surgery (RIGHT TOTAL KNEE  REPLACEMENT, RIGHT CARPAL TUNNEL, L TOTAL     SHOULDER); No: Abdominal Surgery, Appendectomy, Bowel Surgery, CABG, Head     Surgery, Oral Surgery, Vascular Surgery      Diabetes - Family Hx:  Brother, Sister      Cancer/Type - Family Hx:  Brother      Is Father Still Living?:  No      Is Mother Still Living?:  No      Social History:  No Tobacco Use, No Alcohol Use, No Recreational Drug use      Smoking status:  Current every day smoker (23yo,  4each, not every day )      Anticoagulation Therapy:  No      Antibiotic Prophylaxis:  No      Medical History:  Yes: Arthritis ( OSTEOARTHRITIS- SHOULDER, HANDS, LEFT KNEE),     Asthma (USES INHALERS ), Chemotherapy/Cancer (LUNG CA ), Diabetes (TYPE II,     BLOOD GLUCOSE AROUND ), High Blood Pressure (ON MEDICATIONS ), Reflux     Disease, Shortness Of Breath (LUNG CA ); No: Blood Disease, Chronic     Bronchitis/COPD, Congestive Heart Failu, Deafness or Ringing Ears, Seizures,     Heart Attack, Hemorrhoids/Rectal Prob, Miscellaneous Medical/oth      Psychiatric History      None            PREVENTION      Hx Influenza Vaccination:  No      Influenza Vaccine Declined:  Yes      2 or More Falls in Past Year?:  No      Fall Past Year with Injury?:  No      Hx Pneumococcal Vaccination:  No      Encouraged to follow-up with:  PCP regarding preventative exams.      Chart initiated by      Erna Cheek MA            ALLERGIES/MEDICATIONS      Allergies:        Coded Allergies:             OXYCODONE HCL (Verified  Allergy, Unknown, ITCHING- CAN TAKE TYLENOL ,     2/10/21)                  PT STATED SHE CAN TAKE TYLENOL      Medications    Last Reconciled on 2/10/21 10:45 by ADRY BARKER      hydrOXYzine HCL (hydrOXYzine HCL) 25 Mg Tablet      25 MG PO HS, #60 TAB 3 Refills         Prov: RUBA DOHERTY         7/27/20       MDI-Albuterol (Ventolin HFA) 8 Gm Hfa.aer.ad      1 PUFFS INH Q4-6H PRN for SHORTNESS OF BREATH, #1 MDI 3 Refills         Prov: LYNN PAREDES PA-C          5/5/20       Losartan Potassium (Losartan*) 100 Mg Tablet      100 MG PO QDAY, #30 TAB 0 Refills         Reported         4/20/20       metFORMIN HCl (metFORMIN HCl) 500 Mg Tablet      500 MG PO BID, #60 TAB 0 Refills         Reported         4/20/20       Metformin HCl (Metformin HCl) 1,000 Mg Tablet      1000 MG PO BID for 30 Days, #60 TAB         Reported         4/20/20       Montelukast Sodium (Montelukast*) 10 Mg Tablet      10 MG PO QDAY, TAB         Reported         2/7/19       glyBURIDE (glyBURIDE) 5 Mg Tablet      5 MG PO BID         Reported         3/15/11       Metoprolol Tartrate (Metoprolol Tartrate*) 50 Mg Tablet      50 MG PO BID         Reported         3/9/11      Current Medications      Current Medications Reviewed 2/10/21            EXAM      Vtials      Vitals:             Height 5 ft 7 in / 170.18 cm           Weight 181 lbs 2 oz / 82.121891 kg           BSA 1.94 m2           BMI 28.4 kg/m2           Temperature 97.0 F / 36.11 C - Temporal           Pulse 56           Respirations 16           Blood Pressure 122/63 Sitting, Left Arm           Pulse Oximetry 94%, Room air            Constitutional      General appearance:  Comfortable            Eyes      Conjunctiva:  Bilateral: Normal            ENMT      Nasal cavity:           Nostril:  Bilateral: Normal         Discharge:  Bilateral: None            Neck      Enlarged nodes:  Bilateral: None      Thyroid - size:  Normal            Respiratory      Work of breathing:  Normal      Chest appearance:        Auscultation:  Bilateral: Diminished at base, Wheezes            Cardiovascular      Rhythm:  regular      Heart sounds:  NORMAL: S1, S2            Gastrointestinal      Abdomen description:  Normal (Delete)            Extremity      Radial pulse:  Bilateral: Normal, Other (No clubbing cyanosis or any edema.)            Skin      General color:  Normal      Rash:           Distribution:  No rash noted            Assessment      Small  cell lung cancer - C34.90            Mediastinal mass - J98.59            Smoker - F17.200            Bronchiectasis         Bronchiectasis without complication         Bronchiectasis type: uncomplicated            COPD (chronic obstructive pulmonary disease) - J44.9         COPD type: chronic bronchitis            Notes      Reviewed the CAT scan.  Scan before and after and patient definitely appear to     be having progressive disease in spite of the chemotherapy as noted by oncology     service.      I briefly discussed with Dr. Perry for redoing the bronchoscopy with EBUS and     biopsies as possible and he agreed to do so.  To this effect will schedule the     bronchoscopy.      I gave the patient air duo 113/14 respiquick samples and refill of the     prescriptions along with Ventolin 1 to 2 puffs every 4 hours as needed.      Strongly advised the patient not to smoke anymore.      The patient expressed understanding about the bronchoscopy and the EBUS which     she had before and the possible complications as well.      New Medications      * Fluticasone/Salmeterol (Airduo Respiclick 113-14 Mcg) 1 EACH AER.POW.BA: 1       PUFF INH RTBID #1      Renewed Medications      * MDI-Albuterol (Ventolin HFA) 8 GM HFA.AER.AD: 1 PUFFS INH Q4-6H PRN SHORTNESS       OF BREATH #1      New Diagnostics      * [facility] Pre-Op Covid Screening, Routine         Dx: Encounter for screening for other viral diseases - Z11.59      * [facility] Pre-Op Covid Screening, Routine         Dx: Encounter for screening for other viral diseases - Z11.59            Patient Education      Tobacco Cessation Counseling:  for 3 - 10 minutes            Patient Education:        Chronic Obstructive Pulmonary Disease            Electronically signed by ADRY BARKER  02/10/2021 10:46       Disclaimer: Converted document may not contain table formatting or lab diagrams. Please see [employer] for the authenticated document.

## 2021-05-28 NOTE — PROGRESS NOTES
Patient: JULIA HOANG     Acct: [id number]     Report: #[id number]  UNIT #: [id number]     : 1960    Encounter Date:2020  PRIMARY CARE: Trent Freeman  ***Signed***  --------------------------------------------------------------------------------------------------------------------  NURSE INTAKE      Visit Type      Established Patient Visit            Chief Complaint      LUNG CANCER (HERE FOR TX)            Referring Provider/Copies To      Primary Care Provider:  Trent Freeman      Copies To:   Trent Freeman            History and Present Illness      Past Oncology Illness History      Ms. Hoang a 60-year-old female who was referred to me for new diagnosis of     lung cancer by Dr. Perry.              She initially presented with chest pain and underwent a chest CT due to concern     for coronavirus.  This chest CT was notable for mediastinal adenopathy up to 3.8    x 5.1 x 5.9 cm.  Patient underwent endobronchial ultrasound on 2020 and     pathology showed large cell neuroendocrine carcinoma. Patient comes in today to     establish care.  She understands that she has a diagnosis of lung cancer but     does not understand the specifics.  Currently she is asymptomatic as the chest     pain has resolved.  She denies significant shortness of breath or cough.  She     does have occasional headaches but relates this to recent stress.            2020 endobronchial ultrasound with biopsy: Lymph node, station 4 R+ for     large cell neuroendocrine carcinoma.  Tumor cells are positive for CD56, CK7,     NSE, pancytokeratin (dot-like), synaptophysin, and TTF-1.  Immunostaining was     negative for p40 and CK 5/6.            2020 brain MRI without contrast: 1) study is limited due to lack of     contrast but no definite acute findings.  2) volume loss indicating age-    appropriate atrophy.  3) chronic ischemic changes.            Patient has smoked for many  years even decades.  She states that she is always     been a light smoker and currently smokes less than a half a pack per day.            PET/CT on 5/4/2020: There appears to be a large right paratracheal mass measures    approximately 5.7 cm in maximum dimension and is similar to the prior CT.  The     maximum SUV is 13.2.  The hypermetabolic activity extends into the precarinal     mass.  There is abnormal FDG activity in the left infrahilar region where there     is suspected adenopathy with a maximum SUV of 7.1.  There are no other     definitive findings of malignancy throughout the chest, abdomen, or pelvis.            Cycle 1 of cisplatin and etoposide on 6/8/2020 with cisplatin split over 3 days.      Cycle 2 on 7/6/2020.  Cycle 3 on 7/27/2020            HPI - Oncology Interim      Ms. Villagomez comes in today for her third cycle of chemotherapy with cisplatin and    etoposide.  She says she has diarrhea for 1 to 2 days but is not taking any     medication to control it.  She has about 3 soft bowel movements a day.  She     denies ringing in her ears or difficulty hearing.  Her only complaint is that     she has had significant pain in her right foot.  This pain started last year     when she fractured the foot but is gotten worse recently.  She has an     appointment with her foot doctor on August 3.  She is also having difficulty     sleeping due to her mind racing about various issues.            On review of labs today is notable that her WBC count is 2.08, hemoglobin 10.5,     .8, platelet count 111, creatinine 1.55, magnesium 1.4.            Clinical Staging      Limited stage            ECOG Performance Status      1            Most Recent Lab Findings      Laboratory Tests      7/27/20 09:54            PAST, FAMILY   Past Medical History      Past Medical History:  Diabetes Type 2      Hematology/Oncology (F):  Lung Cancer      Genetic/Metabolic:  None            Past Surgical History       Biopsy, Cholecystectomy, Joint Replacement (SHOULDER)            CARPAL TUNNEL X2 RIGHT HAND      TUBAL LIGATION            Family History      Family History:  No Family History            Social History      Marital Status:        Lives independently:  Yes      Number of Children:  4      Occupation:  HOUSEWIFE            Tobacco Use      Tobacco status:  Light tobacco smoker (ONE PACK PER WEEK)            Alcohol Use      Alcohol intake:  3 TIMES WEEKLY            Substance Use      Substance use:  Marijuana            REVIEW OF SYSTEMS      General:  Admits: Fatigue;          Denies: Appetite Change, Fever, Night Sweats, Weight Gain, Weight Loss      Other      RIGHT FOOT PAIN, LEFT FOOT PAIN, HEAD PAIN, TROUBLE SLEEPING      Eye:  Denies Blurred Vision, Denies Corrective Lenses, Denies Diplopia, Denies     Vision Changes      ENT:  Denies Headache, Denies Hearing Loss, Denies Hoarseness, Denies Sore     Throat      Cardiovascular:  Denies Chest Pain, Denies Palpitations      Respiratory:  Denies: Cough, Coughing Blood, Productive Cough, Shortness of Air,    Wheezing      Gastrointestinal:  Denies Bloody Stools, Denies Constipation, Denies Diarrhea,     Denies Nausea/Vomiting, Denies Problem Swallowing, Denies Unable to Control     Bowels      Genitourinary:  Denies Blood in Urine, Denies Incontinence, Denies Painful     Urination      Musculoskeletal:  Denies Back Pain, Denies Muscle Pain, Denies Painful Joints      Other      RIGHT FOOT PAIN, LEFT FOOT PAIN, HEAD PAIN, TROUBLE SLEEPING      Integumentary:  Denies Itching, Denies Lesions, Denies Rash      Neurologic:  Denies Dizziness, Denies Numbness\Tingling, Denies Seizures      Psychiatric:  Denies Anxiety, Denies Depression      Endocrine:  Denies Cold Intolerance, Denies Heat Intolerance      Hematologic/Lymphatic:  Denies Bruising, Denies Bleeding, Denies Enlarged Lymph     Nodes      Reproductive:  Denies: Menopause, Heavy Periods, Pregnant,  Still Menstruating            VITAL SIGNS AND SCORES      Vitals      Weight 200 lbs 2.843 oz / 90.8 kg      Temperature 98.0 F / 36.67 C - Temporal      Pulse 83      Respirations 18      Blood Pressure 137/76 Sitting      Pulse Oximetry 97%, ROOM AIR            Pain Score      Experiencing any pain?:  Yes (RIGHT FOOT PAIN, LEFT FOOT PAIN, HEAD PAIN,     TROUBLE SLEEPING)      Pain Scale Used:  Numerical      Pain Intensity:  6            Fatigue Score      Experiencing any fatigue?:  Yes      Fatigue (0-10 scale):  2            EXAM      General: Alert, cooperative, no acute distress      Eyes: Anicteric sclera, PERRLA      HEENT: Oropharynx clear, no exudates      Respiratory: CTAB, normal respiratory effort      Abdomen: Normal active bowel sounds, no tenderness, no distention      Cardiovascular: RRR, no murmur, no peripheral edema      Skin: Normal tone, no rash, no lesions      Psychiatric: Appropriate affect, intact judgment      Neurologic: No focal sensory or motor deficits, no weakness, numbness, dizziness      Musculoskeletal: Normal muscle strength, normal muscle tone, no abnormalities     noted of the right foot or lower leg      Extremities: No clubbing, cyanosis, or deformities            PREVENTION      Hx Influenza Vaccination:  No      Influenza Vaccine Declined:  No      2 or More Falls in Past Year?:  No      Fall Past Year with Injury?:  No      Hx Pneumococcal Vaccination:  No      Encouraged to follow-up with:  PCP regarding preventative exams.      Chart initiated by      JASON LANG MA            ALLERGY/MEDS      Allergies      Coded Allergies:             OXYCODONE HCL (Verified  Allergy, Unknown, ITCHING- CAN TAKE TYLENOL ,     7/27/20)                  PT STATED SHE CAN TAKE TYLENOL            Medications      Last Reconciled on 8/2/20 13:06 by RUBA DOHERTY      hydrOXYzine HCL (hydrOXYzine HCL) 25 Mg Tablet      25 MG PO HS, #60 TAB 3 Refills         Prov: RUBA DOHERTY          7/27/20       (diuretic)   No Conflict Check               Reported         6/9/20       MDI-Albuterol (Ventolin HFA) 8 Gm Hfa.aer.ad      1 PUFFS INH Q4-6H PRN for SHORTNESS OF BREATH, #1 MDI 3 Refills         Prov: Luana Milian PA-C         5/5/20       Losartan Potassium (Losartan*) 100 Mg Tablet      100 MG PO QDAY, #30 TAB 0 Refills         Reported         4/20/20       metFORMIN HCl (metFORMIN HCl) 500 Mg Tablet      500 MG PO BID, #60 TAB 0 Refills         Reported         4/20/20       metFORMIN HCl (metFORMIN HCl) 1,000 Mg Tablet      1000 MG PO BID for 30 Days, #60 TAB         Reported         4/20/20       Montelukast Sodium (Montelukast*) 10 Mg Tablet      10 MG PO QDAY, TAB         Reported         2/7/19       glyBURIDE (glyBURIDE) 5 Mg Tablet      5 MG PO BID         Reported         3/15/11       Metoprolol Tartrate (Metoprolol Tartrate*) 50 Mg Tablet      50 MG PO BID         Reported         3/9/11      Medications Reviewed:  No Changes made to meds            IMPRESSION/PLAN      Impression      60-year-old female limited stage small cell lung cancer here for her third cycle     of chemotherapy with cisplatin and etoposide.            Diagnosis      Lung cancer - C34.90            Hypomagnesemia - E83.42            Notes      New Medications      * hydrOXYzine HCL 25 MG TABLET: 25 MG PO HS #60      New Diagnostics      * BMP, 07/29/20         Dx: Lung cancer - C34.90      * Magnesium Serum, 07/29/20         Dx: Lung cancer - C34.90            Plan      Limited stage small cell lung cancer: Patient is here today for cycle 3 of     cisplatin and etoposide.  On review labs is notable that her absolute neutrophil     count is just over 1000.  She will proceed to treatment today but her fourth     cycle of chemotherapy may be delayed due to cytopenias.  I cannot further dose     reduce her cisplatin due to the fact that it is all ready distributed across all     3 days of treatment.  Patient was  cautioned to be extra careful regarding risk     of infection as she will likely be transiently neutropenic over the next 2 to 3     weeks.            Right foot pain: Secondary to past fracture.  The patient has an appointment     with her foot doctor on August 3.  Increased pain is unlikely to be secondary to     chemotherapy.            Diarrhea: Patient has only 3 loose bowel movements per day and is not yet taking     medication.  Counseled her on the use of Imodium.            Insomnia: Likely secondary to anxiety and increased worry.  We will start the     patient on hydroxyzine 25 mg nightly.  She can double this if necessary although     it may cause increased sedation.            Hypomagnesemia: We will give the patient 2 g of magnesium today and repeat BMP     and magnesium on day 3.            CKD: Patient's creatinine is stable at 1.55.            Patient Education      Patient Education Provided:  Yes            Electronically signed by RUBA DOHERTY  08/02/2020 13:07       Disclaimer: Converted document may not contain table formatting or lab diagrams. Please see [employer] for the authenticated document.

## 2021-05-28 NOTE — PROGRESS NOTES
Patient: JULIA HOANG     Acct: [id number]     Report: #[id number]  UNIT #: [id number]     : 1960    Encounter Date:2020  PRIMARY CARE: Trnet Freeman  ***Signed***  --------------------------------------------------------------------------------------------------------------------  NURSE INTAKE      Visit Type      Established Patient Visit            Chief Complaint      LUNG CA            Referring Provider/Copies To      Primary Care Provider:  Trent Freeman      Copies To:   Trent Freeman            History and Present Illness      Past Oncology Illness History      Ms. Hoang a 60-year-old female who was referred to me for new diagnosis of     lung cancer by Dr. Perry.              She initially presented with chest pain and underwent a chest CT due to concern     for coronavirus.  This chest CT was notable for mediastinal adenopathy up to 3.8    x 5.1 x 5.9 cm.  Patient underwent endobronchial ultrasound on 2020 and     pathology showed large cell neuroendocrine carcinoma. Patient comes in today to     establish care.  She understands that she has a diagnosis of lung cancer but     does not understand the specifics.  Currently she is asymptomatic as the chest     pain has resolved.  She denies significant shortness of breath or cough.  She     does have occasional headaches but relates this to recent stress.            2020 endobronchial ultrasound with biopsy: Lymph node, station 4 R+ for     large cell neuroendocrine carcinoma.  Tumor cells are positive for CD56, CK7,     NSE, pancytokeratin (dot-like), synaptophysin, and TTF-1.  Immunostaining was     negative for p40 and CK 5/6.            2020 brain MRI without contrast: 1) study is limited due to lack of     contrast but no definite acute findings.  2) volume loss indicating age-    appropriate atrophy.  3) chronic ischemic changes.            Patient has smoked for many years even decades.   She states that she is always     been a light smoker and currently smokes less than a half a pack per day.            PET/CT on 5/4/2020: There appears to be a large right paratracheal mass measures    approximately 5.7 cm in maximum dimension and is similar to the prior CT.  The     maximum SUV is 13.2.  The hypermetabolic activity extends into the precarinal     mass.  There is abnormal FDG activity in the left infrahilar region where there     is suspected adenopathy with a maximum SUV of 7.1.  There are no other     definitive findings of malignancy throughout the chest, abdomen, or pelvis.            Cycle 1 of cisplatin and etoposide on 6/8/2020 with cisplatin split over 3 days.      Cycle 2 on 7/6/2020.  Cycle 3 on 7/27/2020. Cycle 4 on 8/17/2020            PET/CT on 10/9/20: No abnormal hypermetabolic activity.  Significant improvement    in mediastinal and left hilar adenopathy.            HPI - Oncology Interim      Patient comes in today for follow-up after her recent PET scan.  Fortunately     this was negative for any evidence of active disease.  Her other complaint is     left upper quadrant pain.  I discussed with her that there are few things that     could cause this pain especially considering her PET scan was negative.  She s    ays the pain is constant and had been ongoing for about 3 months.  At times it     has a sharper quality.            ECOG Performance Status      1            PAST, FAMILY   Past Medical History      Past Medical History:  Diabetes Type 2      Hematology/Oncology (F):  Lung Cancer      Genetic/Metabolic:  None            Past Surgical History      Biopsy, Cholecystectomy, Joint Replacement            CARPAL TUNNEL X2 RIGHT HAND      TUBAL LIGATION            Family History      Family History:  No Family History            Social History      Lives independently:  Yes      Number of Children:  4      Occupation:  HOUSEWIFE            Tobacco Use      Tobacco status:   Light tobacco smoker            Substance Use      Substance use:  Marijuana            REVIEW OF SYSTEMS      General:  Denies: Appetite Change, Fatigue, Fever, Night Sweats, Weight Gain,     Weight Loss      Eye:  Admits Corrective Lenses; Denies Blurred Vision, Denies Diplopia, Denies     Vision Changes      ENT:  Denies Headache, Denies Hearing Loss, Denies Hoarseness, Denies Sore Throa    t      Cardiovascular:  Denies Chest Pain, Denies Palpitations      Respiratory:  Denies: Cough, Coughing Blood, Productive Cough, Shortness of Air,    Wheezing      Gastrointestinal:  Denies Bloody Stools, Denies Constipation, Denies Diarrhea,     Denies Nausea/Vomiting, Denies Problem Swallowing, Denies Unable to Control     Bowels      Other      Left upper quadrant abdominal pain      Genitourinary:  Denies Blood in Urine, Denies Incontinence, Denies Painful     Urination      Musculoskeletal:  Denies Back Pain, Denies Muscle Pain, Denies Painful Joints      Other      LEFT LOWER STOMACH PAIN      Integumentary:  Denies Itching, Denies Lesions, Denies Rash      Neurologic:  Denies Dizziness, Denies Numbness\Tingling, Denies Seizures      Psychiatric:  Denies Anxiety, Denies Depression      Endocrine:  Denies Cold Intolerance, Denies Heat Intolerance      Hematologic/Lymphatic:  Admits Bruising; Denies Bleeding, Denies Enlarged Lymph     Nodes      Reproductive:  Denies: Menopause, Heavy Periods, Pregnant, Still Menstruating            VITAL SIGNS AND SCORES      Vitals      Weight 187 lbs 13.311 oz / 85.2 kg      Temperature 97.0 F / 36.11 C - Temporal      Pulse 72      Respirations 18      Blood Pressure 148/81 Sitting, Left Arm      Pulse Oximetry 97%, RM AIR            Pain Score      Experiencing any pain?:  Yes      Pain Scale Used:  Numerical      Pain Intensity:  7            Fatigue Score      Experiencing any fatigue?:  No      Fatigue (0-10 scale):  0 (none)            EXAM      General: Alert, cooperative, no  acute distress      Eyes: Anicteric sclera, PERRLA      Respiratory: CTAB, normal respiratory effort      Abdomen: Normal active bowel sounds, no tenderness, no distention      Cardiovascular: RRR, no murmur, no peripheral edema      Skin: Normal tone, no rash, no lesions      Psychiatric: Appropriate affect, intact judgment      Neurologic: No focal sensory or motor deficits, no weakness, numbness, dizziness      Musculoskeletal: Normal muscle strength, normal muscle tone      Extremities: No clubbing, cyanosis, or deformities            PREVENTION      Hx Influenza Vaccination:  No      Influenza Vaccine Declined:  No      2 or More Falls in Past Year?:  No      Fall Past Year with Injury?:  No      Hx Pneumococcal Vaccination:  No      Encouraged to follow-up with:  PCP regarding preventative exams.      Chart initiated by      TIMO MARTINEZ MA            ALLERGY/MEDS      Allergies      Coded Allergies:             OXYCODONE HCL (Verified  Allergy, Unknown, ITCHING- CAN TAKE TYLENOL ,     11/13/20)                  PT STATED SHE CAN TAKE TYLENOL            Medications      Last Reconciled on 11/30/20 00:01 by RUBA DOHERTY      hydrOXYzine HCL (hydrOXYzine HCL) 25 Mg Tablet      25 MG PO HS, #60 TAB 3 Refills         Prov: RUBA DOHERTY         7/27/20       (diuretic)   No Conflict Check               Reported         6/9/20       MDI-Albuterol (Ventolin HFA) 8 Gm Hfa.aer.ad      1 PUFFS INH Q4-6H PRN for SHORTNESS OF BREATH, #1 MDI 3 Refills         Prov: LYNN PAREDES PA-C         5/5/20       Losartan Potassium (Losartan*) 100 Mg Tablet      100 MG PO QDAY, #30 TAB 0 Refills         Reported         4/20/20       metFORMIN HCl (metFORMIN HCl) 500 Mg Tablet      500 MG PO BID, #60 TAB 0 Refills         Reported         4/20/20       Metformin HCl (Metformin HCl) 1,000 Mg Tablet      1000 MG PO BID for 30 Days, #60 TAB         Reported         4/20/20       Montelukast Sodium (Montelukast*) 10 Mg Tablet       10 MG PO QDAY, TAB         Reported         2/7/19       glyBURIDE (glyBURIDE) 5 Mg Tablet      5 MG PO BID         Reported         3/15/11       Metoprolol Tartrate (Metoprolol Tartrate*) 50 Mg Tablet      50 MG PO BID         Reported         3/9/11      Medications Reviewed:  No Changes made to meds            IMPRESSION/PLAN      Diagnosis      Small cell lung cancer - C34.90            Notes      New Diagnostics      * CT Abd/Pelvis/Chest W/Contrast, 2 Months         Dx: Small cell lung cancer - C34.90      * CBC, 2 Months         Dx: Small cell lung cancer - C34.90      * CMP Comp Metabolic Panel, 2 Months         Dx: Small cell lung cancer - C34.90            Plan      Limited stage small cell lung cancer: Patient completed 4 cycles of chemotherapy     with concurrent radiation.  Her course was complicated by neutropenia and CKD.      Recent PET scan shows no evidence of disease.  Patient will follow up with me     in early January with CT CAP with contrast.  We will continue to follow her     closely with scans every 3 months.            Left upper quadrant pain: Ongoing for 3 months.  Imaging shows no evidence of     abnormality.  Recommend patient discuss this with her primary care provider.            Patient Education      Patient Education Provided:  Yes            Electronically signed by RUAB DOHERTY  11/30/2020 00:01       Disclaimer: Converted document may not contain table formatting or lab diagrams. Please see [employer] for the authenticated document.

## 2021-05-28 NOTE — PROGRESS NOTES
Patient: JULIA HOANG     Acct: [id number]     Report: #[id number]  UNIT #: [id number]     : 1960    Encounter Date:2021  PRIMARY CARE: Trent Freeman  ***Signed***  --------------------------------------------------------------------------------------------------------------------  TELEHEALTH NOTE      Past Oncology Illness History      Ms. Hoang a 60-year-old female who was referred to me for new diagnosis of     lung cancer by Dr. Perry.              She initially presented with chest pain and underwent a chest CT due to concern     for coronavirus.  This chest CT was notable for mediastinal adenopathy up to 3.8    x 5.1 x 5.9 cm.  Patient underwent endobronchial ultrasound on 2020 and     pathology showed large cell neuroendocrine carcinoma. Patient comes in today to     establish care.  She understands that she has a diagnosis of lung cancer but     does not understand the specifics.  Currently she is asymptomatic as the chest     pain has resolved.  She denies significant shortness of breath or cough.  She     does have occasional headaches but relates this to recent stress.            2020 endobronchial ultrasound with biopsy: Lymph node, station 4 R+ for     large cell neuroendocrine carcinoma.  Tumor cells are positive for CD56, CK7,     NSE, pancytokeratin (dot-like), synaptophysin, and TTF-1.  Immunostaining was     negative for p40 and CK 5/6.            2020 brain MRI without contrast: 1) study is limited due to lack of     contrast but no definite acute findings.  2) volume loss indicating age-    appropriate atrophy.  3) chronic ischemic changes.            Patient has smoked for many years even decades.  She states that she is always     been a light smoker and currently smokes less than a half a pack per day.            PET/CT on 2020: There appears to be a large right paratracheal mass measures    approximately 5.7 cm in maximum dimension and  is similar to the prior CT.  The     maximum SUV is 13.2.  The hypermetabolic activity extends into the precarinal     mass.  There is abnormal FDG activity in the left infrahilar region where there     is suspected adenopathy with a maximum SUV of 7.1.  There are no other     definitive findings of malignancy throughout the chest, abdomen, or pelvis.            Cycle 1 of cisplatin and etoposide on 6/8/2020 with cisplatin split over 3 days.      Cycle 2 on 7/6/2020.  Cycle 3 on 7/27/2020. Cycle 4 on 8/17/2020            PET/CT on 10/9/20: No abnormal hypermetabolic activity.  Significant improvement    in mediastinal and left hilar adenopathy.            CT CAP on 1/7/2021 shows enlarged mediastinal lymph nodes concerning for     recurrence.            Brain MRI on 1/28/2021 is negative for CNS involvement            History of Present Illness            Chief Complaint: LUNG CANCER             Mery Collazo is presenting for evaluation via Telehealth visit by     phone. Verbal consent obtained before beginning visit.            Provider spent (14) minutes with the patient during telehealth visit.            The following staff were present during the visit: (Jeniffer Gee, RN)                         Overview of Symptoms      Presents today as close to follow-up on her bronchoscopy and biopsy.  However     the patient canceled his biopsy due to the weather.  She asked me to again     reviewed the CT scan from 1/7/2021.  We talked about these results and that     could impact her breathing.  She says that she is having worsening shortness of     breath.  She initially stated that the shortness of breath is worse when lying     down but said that it was also worse when she was up walking around.  She told     me that there was some medication that had worked better for her than what Dr. Perry had previously prescribed.  Then her  came on the phone and they     discussed the medications, and the  results.  Then the patient decided that she     had gotten that medication recently and it had not made her breathing better.      It is difficult for me to tell what medication she is currently on but I asked     her to discuss this with Dr. Perry when she reschedules the appointment.  She     admits that she is out of albuterol.  She is worried that she is going to have     severe breathing problems and have to go back to the hospital at some point     soon.            She did have a brain MRI on 1/28/2021 and says that no one has given her the     result yet.  I did review the results and relayed the fact that this was     negative.            While you are on the phone I could hear audible wheezing and there was some     increased work of breathing while she was talking.            After the phone call I called Dr. Perry to tell him about her call and that I     was going to send medication for the patient.  He said that his office has been     trying to reach the patient to reschedule the bronchoscopy.  They have time     tomorrow but will be out next week.            Allergies/Medications      Allergies:        Coded Allergies:             OXYCODONE HCL (Verified  Allergy, Unknown, ITCHING- CAN TAKE TYLENOL ,     2/12/21)                  PT STATED SHE CAN TAKE TYLENOL      Medications    Last Reconciled on 2/18/21 21:50 by RUBA DOHERTY      predniSONE (predniSONE) 20 Mg Tablet      40 MG PO QDAY for 5 Days, #10 TAB 0 Refills         Prov: RUBA DOHERTY         2/18/21       MDI-Albuterol (Ventolin HFA) 8 Gm Hfa.aer.ad      1 PUFFS INH Q4-6H PRN for SHORTNESS OF BREATH, #1 MDI 3 Refills         Prov: RUBA DOHERTY         2/18/21       Fluticasone/Salmeterol (Airduo Respiclick 113-14 Mcg) 1 Each Aer.pow.ba      1 PUFF INH RTBID, #1 AER.POW.BA 5 Refills         Prov: ADRY BARKER         2/10/21       hydrOXYzine HCL (hydrOXYzine HCL) 25 Mg Tablet      25 MG PO HS, #60 TAB 3 Refills         Prov:  RUBA DOHERTY         7/27/20       Losartan Potassium (Losartan*) 100 Mg Tablet      100 MG PO QDAY, #30 TAB 0 Refills         Reported         4/20/20       Metformin HCl (Metformin HCl) 1,000 Mg Tablet      1500 MG PO BID for 30 Days, #90 TAB         Reported         4/20/20       Montelukast Sodium (Montelukast*) 10 Mg Tablet      10 MG PO QDAY, TAB         Reported         2/7/19       glyBURIDE (glyBURIDE) 5 Mg Tablet      5 MG PO BID         Reported         3/15/11       Metoprolol Tartrate (Metoprolol Tartrate*) 50 Mg Tablet      50 MG PO BID         Reported         3/9/11            Plan/Instructions      Ambulatory Assessment/Plan:        Notes      New Medications      * predniSONE 20 MG TABLET: 40 MG PO QDAY 5 Days #10      Renewed Medications      * MDI-Albuterol (Ventolin HFA) 8 GM HFA.AER.AD: 1 PUFFS INH Q4-6H PRN SHORTNESS       OF BREATH #1      Plan/Instructions            * Plan Of Care:       * Large cell NET of the lung: Patient was treated similarly to Limited stage       small cell lung cancer with concurrent chemoradiation.  Follow-up PET scan       showed improvement in disease.  She did not undergo PCI.  CT scan on 1/7/2020       showed worsening mediastinal adenopathy.  Unfortunate this is concerning for       progression of disease in the mediastinal lymph nodes but the patient missed       her recent bronchoscopy due to weather.  I encouraged her to reschedule this       soon as possible and contacted Dr. Perry regarding these details.  I will       schedule a follow-up appointment in 2 weeks to discuss bronchoscopy results.        This can be moved forward if needed.  Her recent brain MRI on 1/28/2021 was       negative.            * COPD exacerbation: Patient had apparent increased work of breathing and       wheezing while talking on the phone.  She is unsure what medication she is       currently taking but now she is out of Adderall.  I will refill albuterol and       sent a  5-day prescription for prednisone 40 mg daily.  I discussed this with       Dr. Perry as well and asked that he help her sort out her other inhaler       medications.            * Chronic conditions reviewed and taken into consideration for today's treatment      plan.      * Patient instructed to seek medical attention urgently for new or worsening       symptoms.      * Patient was educated/instructed on their diagnosis, treatment and medications       prior to discharge from the clinic today.            Electronically signed by RUBA DOHERTY  02/18/2021 21:50       Disclaimer: Converted document may not contain table formatting or lab diagrams. Please see [employer] for the authenticated document.

## 2021-05-28 NOTE — PROGRESS NOTES
Patient: JULIA HOANG     Acct: [id number]     Report: #[id number]  UNIT #: [id number]     : 1960    Encounter Date:2020  PRIMARY CARE: Trent Freeman  ***Signed***  --------------------------------------------------------------------------------------------------------------------  NURSE INTAKE      Visit Type      New Patient Visit            Chief Complaint      LUNG MASS/LUNG CA  UNAWARE OF WHY SHE IS HERE TODAY            Referring Provider/Copies To      Referring Provider:  A      Primary Care Provider:  Trent Freeman            History and Present Illness      Past Oncology Illness History      Ms. Hoang a 60-year-old female who was referred to me for new diagnosis of     lung cancer by Dr. Perry.              She initially presented with chest pain and underwent a chest CT due to concern     for coronavirus.  This chest CT was notable for mediastinal adenopathy up to 3.8    x 5.1 x 5.9 cm.  Patient underwent endobronchial ultrasound on 2020 and     pathology showed large cell neuroendocrine carcinoma. Patient comes in today to     establish care.  She understands that she has a diagnosis of lung cancer but     does not understand the specifics.  Currently she is asymptomatic as the chest     pain has resolved.  She denies significant shortness of breath or cough.  She     does have occasional headaches but relates this to recent stress.            2020 endobronchial ultrasound with biopsy: Lymph node, station 4 R+ for     metastatic large cell neuroendocrine carcinoma.  Tumor cells are positive for     CD56, CK7, NSE, pancytokeratin (dot-like), synaptophysin, and TTF-1.      Immunostaining was negative for p40 and CK 5/6.      2020 brain MRI without contrast: 1) study is limited due to lack of     contrast but no definite acute findings.  2) volume loss indicating age-    appropriate atrophy.  3) chronic ischemic changes.            Patient was  scheduled for PET scan on 2020.            Patient has smoked for many years even decades.  She states that she is always     been a light smoker and currently smokes less than a half a pack per day.            ECOG Performance Status      0            Most Recent Lab Findings      Laboratory Tests      20 05:34            Laboratory Tests            Test       20      05:34             Magnesium Level       1.99 mg/dL      (1.60-2.30)            Most Recent Imaging Findings      [facility]                PACS RADIOLOGY REPORT            Patient: JULIA HOANG   Acct: #[id number]   Report: #[id number]            UNIT #: [id number]    DOS: 20 0223      INSURANCE:[employer] OUT OF STATE   ORDER #:[id number]      LOCATION:[facility]  8   : 1960            PROVIDERS      ADMITTING:  Anton Lorenzo   ATTENDING: Anton Lorenzo      FAMILY:  CALI,JAMES   ORDERING:  Anton Lorenzo         OTHER:    DICTATING:  MONIQUE DOMINGUEZ MD            REQ #:[id number]   EXAM:CHWO - CT CHEST without CONTRAST      REASON FOR EXAM:  chest pain, mass      REASON FOR VISIT:  HYPERGLYCEMIA,N/V,R/O COVID            *******Signed******         PROCEDURE:   CT CHEST WITHOUT CONTRAST             COMPARISON:   None.             INDICATIONS:   chest pain, right mediastinal mass, possible covid             TECHNIQUE:   CT images were created without the administration of contrast     material.               PROTOCOL:     Standard imaging protocol performed                RADIATION:     DLP: 974mGy*cm          Automated exposure control was utilized to minimize radiation dose.              FINDINGS:         There is mild bilateral basilar atelectasis.  There are no focal airspace     opacities.  There is no       evidence of a solid pulmonary nodule or pulmonary parenchymal mass.             There is extensive mediastinal adenopathy.  One lymph node is  seen in the right     paratracheal space       measuring up to 5.1 x 3.8 x 5.9 cm.  There is extensive subcarinal adenopathy up    to at least 7.1 x       4.9 cm.  Given the amount of pathologic adenopathy and the centrally necrotic     appearance, lymphoma       would be most likely etiology.             Limited imaging of the upper solid abdominal structures fails to demonstrate     upper abdominal       adenopathy or upper abdominal abnormality.  There are postoperative changes from    left shoulder       arthroplasty.  The other osseous structures appear to be within the range of     normal for age.             CONCLUSION:         1. Pathologic mediastinal adenopathy up to 3.8 x 5.1 x 5.9 cm.  Most likely     etiology is lymphoma.              MONIQUE DOMINGUEZ MD             Electronically Signed and Approved By: MONIQUE DOMINGUEZ MD on 2020 at 3:02                  _________________________________________________________________________    ________________________________________________________________________________      ______________________________________               [facility]                PACS RADIOLOGY REPORT            Patient: JULIA HOANG   Acct: #[id number]   Report: #[id number]            UNIT #: [id number]    DOS: 20       INSURANCE:[facility] OUT OF STATE   ORDER #:MRI [id number]      LOCATION:[facility]  0208-01   : 1960            PROVIDERS      ADMITTING:  IGNACIA MOE   ATTENDING: IGNACIA MOE      FAMILY:  Trent Freeman   ORDERING:  CATA PLUMMER         OTHER:    DICTATING:  JENNIFER MARTINEZ MD            REQ #:[id number]   EXAM:BRAWO - MRI BRAIN  wo CONTRAST      REASON FOR EXAM:  LUNG CANCER      REASON FOR VISIT:  HYPERGLYCEMIA,N/V,R/O COVID            *******Signed******         PROCEDURE:   MRI BRAIN WITHOUT CONTRAST             COMPARISON:   None.             INDICATIONS:   LUNG CANCER              TECHNIQUE:   A variety of imaging planes and parameters were utilized for     visualization of suspected       pathology.  Images were performed without contrast.             FINDINGS:         The study is limited by noncontrast technique in excluding metastatic disease.      There is slight       prominence of the cerebral sulci, fissures, and ventricles indicating mild     volume loss due to       age-appropriate atrophy.  The basal cisterns are well-maintained.  There are pu    nctate areas of       periventricular white matter signal which is nonspecific, but felt to represent     chronic       microvascular ischemia.  There is no acute hemorrhage, midline shift, or     suspicious extra-axial       fluid collections.  There is one subtle area of restricted diffusion in the mid     left corona radiata       measuring 4 mm which does not become hypointense on the ADC map.  This is     probably related to an       old lacunar infarct.  There are normal signal voids within the intracranial     arteries and the major       dural sinuses.  The orbital contents are normal.  The paranasal and mastoid     sinuses are clear.             CONCLUSION:         1. The study is limited in excluding intracranial metastatic disease as it was     performed without       contrast.      2. Volume loss indicating mild age-appropriate atrophy.      3. Chronic ischemic changes.      4. No definite acute findings.              JENNIFER MARTINEZ MD             Electronically Signed and Approved By: JENNIFER MARTINEZ MD on 4/24/2020 at 11:55                        PAST, FAMILY   Past Medical History      Past Medical History:  Diabetes Type 2      Hematology/Oncology (F):  Lung Cancer      Genetic/Metabolic:  None            Past Surgical History      Biopsy, Cholecystectomy, Joint Replacement (SHOULDER AND KNEWW)            CARPAL TUNNEL X2 RIGHT HAND      TUBAL LIGATION            Family History      Family History:  No Family History             Social History      Marital Status:        Lives independently:  Yes      Number of Children:  4      Occupation:  HOUSEWIFE            Tobacco Use      Tobacco status:  Light tobacco smoker (ONE PACK PER WEEK)            Alcohol Use      Alcohol intake:  3 TIMES WEEKLY            Substance Use      Substance use:  Marijuana            REVIEW OF SYSTEMS      General:  Admits: Appetite Change (DECREASED X 2 MONTHS), Fatigue, Weight Loss;          Denies: Fever, Night Sweats, Weight Gain      Eye:  Admits Blurred Vision, Admits Corrective Lenses; Denies Diplopia, Denies     Vision Changes      ENT:  Admits Headache; Denies Hearing Loss, Denies Hoarseness, Denies Sore     Throat      Cardiovascular:  Denies Chest Pain, Denies Palpitations      Respiratory:  Admits: Shortness of Air;          Denies: Cough, Coughing Blood, Productive Cough, Wheezing      Gastrointestinal:  Admits Nausea/Vomiting; Denies Bloody Stools, Denies     Constipation, Denies Diarrhea, Denies Problem Swallowing, Denies Unable to     Control Bowels      Genitourinary:  Denies Blood in Urine, Denies Incontinence, Denies Painful     Urination      Musculoskeletal:  Denies Back Pain, Denies Muscle Pain, Denies Painful Joints      Integumentary:  Admits Itching; Denies Lesions, Denies Rash      Neurologic:  Denies Dizziness, Denies Numbness\Tingling, Denies Seizures      Psychiatric:  Denies Anxiety, Denies Depression      Endocrine:  Admits Cold Intolerance      Reproductive:  Denies: Menopause, Heavy Periods, Pregnant, Still Menstruating            VITAL SIGNS AND SCORES      Vitals      Height 5 ft 5.75 in / 167 cm      Weight 192 lbs 14.440 oz / 87.5 kg      BSA 1.96 m2      BMI 31.4 kg/m2      Temperature 98 F / 36.67 C - Temporal      Pulse 68      Respirations 20      Blood Pressure 100/80 Sitting, Left Arm      Pulse Oximetry 100%, ROOM AIR            Pain Score      Experiencing any pain?:  No      Pain Scale Used:  Numerical       Pain Intensity:  0            Fatigue Score      Experiencing any fatigue?:  Yes      Fatigue (0-10 scale):  5            EXAM      General: Alert, cooperative, no acute distress      Eyes: Anicteric sclera, PERRLA      HEENT: Oropharynx clear, no exudates      Respiratory: CTAB, normal respiratory effort      Abdomen: Normal active bowel sounds, no tenderness, no distention      Cardiovascular: RRR, no murmur, no peripheral edema      Skin: Normal tone, no rash, no lesions      Psychiatric: Appropriate affect, intact judgment      Neurologic: No focal sensory or motor deficits, no weakness, numbness, dizziness      Musculoskeletal: Normal muscle strength, normal muscle tone      Extremities: No clubbing, cyanosis, or deformities            PREVENTION      Hx Influenza Vaccination:  No      Influenza Vaccine Declined:  No      2 or More Falls Past Year?:  No      Fall Past Year with Injury?:  No      Chart initiated by      PIPER TAYLOR            ALLERGY/MEDS      Allergies      Coded Allergies:             OXYCODONE HCL (Verified  Allergy, Unknown, ITCHING- CAN TAKE TYLENOL ,     2/7/19)                  PT STATED SHE CAN TAKE TYLENOL            Medications      Last Reconciled on 5/4/20 16:29 by RUBA DOHERTY      Losartan Potassium (Losartan*) 100 Mg Tablet      100 MG PO QDAY, #30 TAB 0 Refills         Reported         4/20/20       metFORMIN HCl (metFORMIN HCl) 500 Mg Tablet      500 MG PO BID, #60 TAB 0 Refills         Reported         4/20/20       metFORMIN HCl (metFORMIN HCl) 1,000 Mg Tablet      1000 MG PO BID for 30 Days, #60 TAB         Reported         4/20/20       Montelukast Sodium (Montelukast*) 10 Mg Tablet      10 MG PO QDAY, TAB         Reported         2/7/19       glyBURIDE (glyBURIDE) 5 Mg Tablet      5 MG PO BID         Reported         3/15/11       Metoprolol Tartrate (Metoprolol Tartrate*) 50 Mg Tablet      50 MG PO BID         Reported         3/9/11            IMPRESSION/PLAN       Impression      60-year-old female with a new diagnosis of lung cancer.            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  However, staging is     not complete.  For limited stage disease she will likely need chemoradiation.      For metastatic disease I would probably favor platinum doublet in the first-line    setting with immunotherapy as second line.  I contacted Dr. Lucero at [facility] and he states there is a clinical trial for large cell lung cancer    with cabozantinib/ipilimumab/nivolumab triplet therapy for second or third line.     This could be an option in the future.  Patient has a PET scan scheduled next     week and will follow-up with me afterward to discuss the specifics of the     treatment plan.  I have instructed her to bring her  to that appointment.            Patient Education      Patient Education Provided:  Yes            Electronically signed by RUBA DOHERTY  05/04/2020 16:30       Disclaimer: Converted document may not contain table formatting or lab diagrams. Please see [email or web address] System for the authenticated document.

## 2021-05-28 NOTE — PROGRESS NOTES
Patient: JULIA HOANG     Acct: [id number]     Report: #[id number]  UNIT #: [id number]     : 1960    Encounter Date:2020  PRIMARY CARE: Trent Freeman  ***Signed***  --------------------------------------------------------------------------------------------------------------------  NURSE INTAKE      Visit Type      Established Patient Visit            Chief Complaint      LUNG CANCER            Referring Provider/Copies To      Primary Care Provider:  Trent Freeman      Copies To:   Trent Freeman            History and Present Illness      Past Oncology Illness History      Ms. Hoang a 60-year-old female who was referred to me for new diagnosis of     lung cancer by Dr. Perry.              She initially presented with chest pain and underwent a chest CT due to concern     for coronavirus.  This chest CT was notable for mediastinal adenopathy up to 3.8    x 5.1 x 5.9 cm.  Patient underwent endobronchial ultrasound on 2020 and     pathology showed large cell neuroendocrine carcinoma. Patient comes in today to     establish care.  She understands that she has a diagnosis of lung cancer but     does not understand the specifics.  Currently she is asymptomatic as the chest     pain has resolved.  She denies significant shortness of breath or cough.  She     does have occasional headaches but relates this to recent stress.            2020 endobronchial ultrasound with biopsy: Lymph node, station 4 R+ for     large cell neuroendocrine carcinoma.  Tumor cells are positive for CD56, CK7,     NSE, pancytokeratin (dot-like), synaptophysin, and TTF-1.  Immunostaining was     negative for p40 and CK 5/6.            2020 brain MRI without contrast: 1) study is limited due to lack of     contrast but no definite acute findings.  2) volume loss indicating age-    appropriate atrophy.  3) chronic ischemic changes.            Patient has smoked for many years even  decades.  She states that she is always     been a light smoker and currently smokes less than a half a pack per day.            PET/CT on 5/4/2020: There appears to be a large right paratracheal mass measures    approximately 5.7 cm in maximum dimension and is similar to the prior CT.  The     maximum SUV is 13.2.  The hypermetabolic activity extends into the precarinal     mass.  There is abnormal FDG activity in the left infrahilar region where there     is suspected adenopathy with a maximum SUV of 7.1.  There are no other     definitive findings of malignancy throughout the chest, abdomen, or pelvis.            Cycle 1 of cisplatin and etoposide on 6/8/2020 with cisplatin split over 3 days.      Cycle 2 on 7/6/2020.  Cycle 3 on 7/27/2020      Cycle 4 on 8/17/2020            HPI - Oncology Interim      Patient comes in today for her fourth and final cycle of chemotherapy with     concurrent radiation.  She says that she is still experiencing some left hand     numbness.  She initially stated that is gotten worse since the start of     treatment.  However she then related to her history of left shoulder surgery.      She said she has had the numbness since before she started chemotherapy.  She is    still able to use buttons and do other small test.  Her only complaint today is     some coughing.  She denies fevers or shortness of breath.  She has lost more     than 5 pounds since her last cycle treatment.            Clinical Staging      Limited stage            ECOG Performance Status      1            Most Recent Lab Findings      Laboratory Tests      8/10/20 11:20            PAST, FAMILY   Past Medical History      Past Medical History:  Diabetes Type 2      Hematology/Oncology (F):  Lung Cancer      Genetic/Metabolic:  None            Past Surgical History      Biopsy, Cholecystectomy, Joint Replacement            CARPAL TUNNEL X2 RIGHT HAND      TUBAL LIGATION            Family History      Family History:   No Family History            Social History      Marital Status:        Lives independently:  Yes      Number of Children:  4      Occupation:  HOUSEWIFE            Tobacco Use      Tobacco status:  Light tobacco smoker            Alcohol Use      Alcohol intake:  3 TIMES WEEKLY            Substance Use      Substance use:  Marijuana            REVIEW OF SYSTEMS      General:  Admits: Fatigue;          Denies: Appetite Change, Fever, Night Sweats, Weight Gain, Weight Loss      Eye:  Denies Blurred Vision, Denies Corrective Lenses, Denies Diplopia, Denies     Vision Changes      ENT:  Denies Headache, Denies Hearing Loss, Denies Hoarseness, Denies Sore     Throat      Cardiovascular:  Denies Chest Pain, Denies Palpitations      Respiratory:  Admits: Cough;          Denies: Coughing Blood, Productive Cough, Shortness of Air, Wheezing      Gastrointestinal:  Denies Bloody Stools, Denies Constipation, Denies Diarrhea,     Denies Nausea/Vomiting, Denies Problem Swallowing, Denies Unable to Control     Bowels      Genitourinary:  Denies Blood in Urine, Denies Incontinence, Denies Painful Urina    tion      Musculoskeletal:  Denies Back Pain, Denies Muscle Pain, Denies Painful Joints      Integumentary:  Denies Itching, Denies Lesions, Denies Rash      Neurologic:  Denies Dizziness; Admits Numbness\Tingling; Denies Seizures      Psychiatric:  Denies Anxiety, Denies Depression      Endocrine:  Denies Cold Intolerance, Denies Heat Intolerance      Hematologic/Lymphatic:  Denies Bruising, Denies Bleeding, Denies Enlarged Lymph     Nodes      Reproductive:  Denies: Menopause, Heavy Periods, Pregnant, Still Menstruating            VITAL SIGNS AND SCORES      Vitals      Weight 193 lbs 12.549 oz / 87.9 kg      Temperature 97.3 F / 36.28 C - Temporal      Pulse 81      Respirations 18      Blood Pressure 138/78 Sitting      Pulse Oximetry 93%, ROOM AIR            Pain Score      Experiencing any pain?:  No      Pain  Intensity:  0            Fatigue Score      Experiencing any fatigue?:  Yes      Fatigue (0-10 scale):  10            EXAM      General: Alert, cooperative, no acute distress, well appearing      Eyes: Anicteric sclera, PERRLA      HEENT: Oropharynx clear, no exudates      Respiratory: CTAB, normal respiratory effort      Abdomen: Normal active bowel sounds, no tenderness, no distention      Cardiovascular: RRR, no murmur, no peripheral edema      Skin: Well-healed scar across the left shoulder from prior surgery      Psychiatric: Appropriate affect, intact judgment      Neurologic: No focal sensory or motor deficits, no weakness, numbness, dizziness      Musculoskeletal: Normal muscle strength, normal muscle tone      Extremities: No clubbing, cyanosis, or deformities            PREVENTION      Hx Influenza Vaccination:  No      Influenza Vaccine Declined:  No      2 or More Falls in Past Year?:  No      Fall Past Year with Injury?:  No      Hx Pneumococcal Vaccination:  No      Encouraged to follow-up with:  PCP regarding preventative exams.      Chart initiated by      JANE RENTERIA [facility]            ALLERGY/MEDS      Allergies      Coded Allergies:             OXYCODONE HCL (Verified  Allergy, Unknown, ITCHING- CAN TAKE TYLENOL ,     8/17/20)                  PT STATED SHE CAN TAKE TYLENOL            Medications      Last Reconciled on 8/17/20 22:14 by RUBA DOHERTY      hydrOXYzine HCL (hydrOXYzine HCL) 25 Mg Tablet      25 MG PO HS, #60 TAB 3 Refills         Prov: RUBA DOHERTY         7/27/20       (diuretic)   No Conflict Check               Reported         6/9/20       MDI-Albuterol (Ventolin HFA) 8 Gm Hfa.aer.ad      1 PUFFS INH Q4-6H PRN for SHORTNESS OF BREATH, #1 MDI 3 Refills         Prov: Luana Milian PA-C         5/5/20       Losartan Potassium (Losartan*) 100 Mg Tablet      100 MG PO QDAY, #30 TAB 0 Refills         Reported         4/20/20       metFORMIN HCl (metFORMIN HCl) 500 Mg Tablet       500 MG PO BID, #60 TAB 0 Refills         Reported         4/20/20       metFORMIN HCl (metFORMIN HCl) 1,000 Mg Tablet      1000 MG PO BID for 30 Days, #60 TAB         Reported         4/20/20       Montelukast Sodium (Montelukast*) 10 Mg Tablet      10 MG PO QDAY, TAB         Reported         2/7/19       glyBURIDE (glyBURIDE) 5 Mg Tablet      5 MG PO BID         Reported         3/15/11       Metoprolol Tartrate (Metoprolol Tartrate*) 50 Mg Tablet      50 MG PO BID         Reported         3/9/11      Medications Reviewed:  No Changes made to meds            IMPRESSION/PLAN      Impression      60-year-old female with limited stage small cell lung cancer here for her fourth     and final cycle of chemotherapy with concurrent radiation.  After her third     cycle of chemotherapy the patient became neutropenic and was given G-CSF by Dr. Thurman in radiation oncology.  Her counts today are improved although she still     has significant CKD.  I will continue with her chemotherapy as previously     scheduled.  She will get both cisplatin and etoposide on days 1, 2, and 3.  We     will repeat the PET scan in 1 month and follow-up with her in clinic after that.            Right foot pain: Unlikely to be secondary to chemotherapy.  She is currently     being treated by her foot doctor.            Left hand neuropathy: Patient has numbness and difficulty using her left hand     and fingers.  She relates this to a prior surgery of the left shoulder and     denies significant worsening since starting chemotherapy.            Diagnosis      Small cell lung cancer - C34.90            Notes      New Diagnostics      * PET SKULLBASE MID THIGH SUBSQ, Routine         Dx: Small cell lung cancer - C34.90            Patient Education      Patient Education Provided:  Yes            Electronically signed by RUBA DOHERTY  08/17/2020 22:14       Disclaimer: Converted document may not contain table formatting or lab diagrams.  Please see [employer] for the authenticated document.

## 2021-05-28 NOTE — PROGRESS NOTES
Patient: JULIA HOANG     Acct: [id number]     Report: #[id number]  UNIT #: [id number]     : 1960    Encounter Date:2021  PRIMARY CARE: Trent Freeman  ***Signed***  --------------------------------------------------------------------------------------------------------------------  NURSE INTAKE      Visit Type      New Patient Visit            Chief Complaint      LUNG CA            Referring Provider/Copies To      Referring Provider:  Trent Freeman      Primary Care Provider:  Trent Freeman      Copies To:   Trent Freeman            History and Present Illness      Past Oncology Illness History      Ms. Hoang a 60-year-old female who was referred to me for new diagnosis of     lung cancer by Dr. Perry.              She initially presented with chest pain and underwent a chest CT due to concern     for coronavirus.  This chest CT was notable for mediastinal adenopathy up to 3.8    x 5.1 x 5.9 cm.  Patient underwent endobronchial ultrasound on 2020 and     pathology showed large cell neuroendocrine carcinoma. Patient comes in today to     establish care.  She understands that she has a diagnosis of lung cancer but     does not understand the specifics.  Currently she is asymptomatic as the chest     pain has resolved.  She denies significant shortness of breath or cough.  She     does have occasional headaches but relates this to recent stress.            2020 endobronchial ultrasound with biopsy: Lymph node, station 4 R+ for     large cell neuroendocrine carcinoma.  Tumor cells are positive for CD56, CK7,     NSE, pancytokeratin (dot-like), synaptophysin, and TTF-1.  Immunostaining was     negative for p40 and CK 5/6.            2020 brain MRI without contrast: 1) study is limited due to lack of     contrast but no definite acute findings.  2) volume loss indicating age-    appropriate atrophy.  3) chronic ischemic changes.            Patient  has smoked for many years even decades.  She states that she is always     been a light smoker and currently smokes less than a half a pack per day.            PET/CT on 5/4/2020: There appears to be a large right paratracheal mass measures    approximately 5.7 cm in maximum dimension and is similar to the prior CT.  The     maximum SUV is 13.2.  The hypermetabolic activity extends into the precarinal     mass.  There is abnormal FDG activity in the left infrahilar region where there     is suspected adenopathy with a maximum SUV of 7.1.  There are no other definiti    ve findings of malignancy throughout the chest, abdomen, or pelvis.            Cycle 1 of cisplatin and etoposide on 6/8/2020 with cisplatin split over 3 days.      Cycle 2 on 7/6/2020.  Cycle 3 on 7/27/2020. Cycle 4 on 8/17/2020            PET/CT on 10/9/20: No abnormal hypermetabolic activity.  Significant improvement    in mediastinal and left hilar adenopathy.            CT CAP on 1/7/2021 shows enlarged mediastinal lymph nodes concerning for     recurrence.            Brain MRI on 1/28/2021 is negative for CNS involvement            Bronchoscopy and biopsy on 3/8/21: negative for malignancy            HPI - Oncology Interim      Patient comes in today to get the results of her recent PET scan.  This shows an    increasing masslike consolidation throughout the right upper lobe.  She says she    was recently in the hospital in [place].  Doctors there did a bronchoscopy     and found that she had a bacterial pneumonia.  Her  explained that the     doctors were able to rule out malignancy.  Unfortunately, since she has been     home she feels she is increasingly short of breath.  She said the shortness of     breath wakes her up at night and causes her to suddenly sit up on the side of     the bed.  When she came into clinic her oxygen saturation was 86% on room air.      It took several minutes for her to catch her breath and for her  oxygen     saturation to come up to normal.  Patient does not have any home oxygen.  She     reports no other symptoms such as fever, productive cough, or weight loss.            ECOG Performance Status      1            Most Recent Imaging Findings      PET CT SKULL BASE TO MID THIGH SUBSEQ             COMPARISON:   [facility], CT, CHEST W/O CONTRAST, 2/25/2021,     21:22.  [facility], CT, CHEST W/O CONTRAST, 4/26/2021, 5:51.  [facility],     PET, SKULL BASE TO       MID THIGH SUBSEQ, 10/09/2020, 10:52.             INDICATIONS:   C34.11             TECHNIQUE:   After obtaining the patient's consent, F-18 FDG was administered     intravenously.  PET/CT       imaging was performed from skull to thigh with multi-planar imaging without oral    or intravenous       contrast material, using a dedicated integrated PET/CT scanner.               RADIONUCLIDE:     11.53 MCI   F18 FDG- I.V.      LABS:                          Blood Glucose 123 mg/dl      UPTAKE TIME:        56 mins      MEDIASTINAL BACKGROUND UPTAKE:  3.2             FINDINGS:         Head and neck:  There is no abnormal FDG accumulation in the head or the neck.      There are no acute       findings.  No evidence of adenopathy or a mass.             Chest:  Compared with 4/26/2021, there is increasing masslike consolidation     throughout the majority       of the right upper lobe with patchy areas of peripheral airspace consolidation.     There is slightly       increased soft tissue density surrounding the right hilum, which nearly     obstructs the bronchus to       the right upper lobe and does obstruct multiple airways in the right upper lobe     and results in       narrowing of the right middle lobe and right lower lobe airways.  There is     stable abnormal dense       consolidation surrounding the left hilar structures involving both lobes in the     left lung with       narrowing of the airways to the  left upper and left lower lobes without complete    obstruction.        These areas of airspace consolidation appear hypermetabolic.  The most active     FDG activity is in       the right lung apex with a maximum SUV of 11.0.  Maximum SUV at the right hilum     is 4.6 and maximum       SUV at the left hilum is 3.2.  There are new small bilateral pleural effusions.     Previously       identified mediastinal fluid and gas collection appears nearly completely     resolved with a small       focus of gas in this region anterior to the right-side of the mid trachea.  The    re is a stable       right-sided chest port.  There is no discrete measurable mediastinal adenopathy.     There is no       pneumothorax.             Abdomen and pelvis:  There is no abnormal FDG accumulation in the abdomen or the    pelvis.  There is       physiologic activity in the liver, kidneys and urinary bladder.  There is no     evidence of adenopathy       or a mass.  There are calcified degenerated uterine fibroids.  There are no     acute findings in the       abdomen or the pelvis.             CONCLUSION:         1. Increasing masslike consolidation throughout the right upper lobe with     surrounding airspace       disease.  There is slightly increased abnormal soft tissue density encasing both    of the kylie       resulting in narrowing of all of the airways.  Maximum SUV at the right lung     apex is 11.0 and       measures 3.2 at the left hilum and 4.6 at the right hilum.  Malignancy seems     most possible at the       medial aspect of the right upper lobe in soft tissue diagnosis is recommended.      Infectious,       inflammatory granulomatous processes remain in the differential.      2. No evidence of hypermetabolic disease outside the chest.              WAYNE PAUL MD             Electronically Signed and Approved By: WAYNE PAUL MD on 5/12/2021 at 13:53            PAST, FAMILY   Past Medical History      Past Medical  History:  Diabetes Type 2      Hematology/Oncology (F):  Lung Cancer      Genetic/Metabolic:  None            Past Surgical History      Biopsy, Cholecystectomy, Joint Replacement            CARPAL TUNNEL X2 RIGHT HAND      TUBAL LIGATION            Family History      Family History:  No Family History            Social History      Lives independently:  Yes      Number of Children:  4      Occupation:  HOUSEWIFE            Tobacco Use      Tobacco status:  Former smoker      Quit status:  Quit date established            Substance Use      Substance use:  Marijuana            REVIEW OF SYSTEMS      General:  Denies: Appetite Change, Fatigue, Fever, Night Sweats, Weight Gain,     Weight Loss      Eye:  Denies Blurred Vision, Denies Corrective Lenses, Denies Diplopia, Denies     Vision Changes      ENT:  Denies Headache, Denies Hearing Loss, Denies Hoarseness, Denies Sore     Throat      Cardiovascular:  Denies Chest Pain, Denies Palpitations      Respiratory:  Admits: Productive Cough, Shortness of Air, Wheezing;          Denies: Cough, Coughing Blood      Gastrointestinal:  Denies Bloody Stools, Denies Constipation, Denies Diarrhea,     Denies Nausea/Vomiting, Denies Problem Swallowing, Denies Unable to Control     Bowels      Genitourinary:  Denies Blood in Urine, Denies Incontinence, Denies Painful     Urination      Musculoskeletal:  Denies Back Pain, Denies Muscle Pain, Denies Painful Joints      Integumentary:  Denies Itching, Denies Lesions, Denies Rash      Neurologic:  Denies Dizziness, Denies Numbness\Tingling, Denies Seizures      Psychiatric:  Denies Anxiety, Denies Depression      Endocrine:  Denies Cold Intolerance, Denies Heat Intolerance      Hematologic/Lymphatic:  Denies Bruising, Denies Bleeding, Denies Enlarged Lymph     Nodes      Reproductive:  Denies: Menopause, Heavy Periods, Pregnant, Still Menstruating            VITAL SIGNS AND SCORES      Vitals      Weight 185 lbs 6.510 oz / 84.1 kg       Temperature 97.5 F / 36.39 C - Temporal      Pulse 58      Respirations 20      Blood Pressure 144/75 Sitting, Left Arm      Pulse Oximetry 86%, RM AIR            Pain Score      Experiencing any pain?:  No      Pain Scale Used:  Numerical      Pain Intensity:  0            Fatigue Score      Experiencing any fatigue?:  No      Fatigue (0-10 scale):  0 (none)            EXAM      General: Alert, cooperative, no acute distress      Eyes: Anicteric sclera, PERRLA      Respiratory: Audible expiratory wheezing heard without stethoscope, normal     respiratory effort, on room air.  Oxygen saturation 86% when she first arrived     to the room but improved to the 90s with rest.      Abdomen: Normal active bowel sounds, no tenderness, no distention      Cardiovascular: RRR, no murmur, no lower extremity edema      Skin: Normal tone, no rash, no lesions      Psychiatric: Appropriate affect, intact judgment      Neurologic: No focal sensory or motor deficits, no weakness, numbness, dizziness      Musculoskeletal: Normal muscle strength and tone      Extremities: No clubbing, cyanosis, or deformities            PREVENTION      Hx Influenza Vaccination:  No      Influenza Vaccine Declined:  Yes      2 or More Falls in Past Year?:  No      Fall Past Year with Injury?:  No      Hx Pneumococcal Vaccination:  No      Encouraged to follow-up with:  PCP regarding preventative exams.      Chart initiated by      CHINA HOLBROOK CMA            ALLERGY/MEDS      Allergies      Coded Allergies:             OXYCODONE HCL (Verified  Allergy, Unknown, ITCHING- CAN TAKE TYLENOL ,     5/18/21)                  PT STATED SHE CAN TAKE TYLENOL            Medications      Last Reconciled on 5/18/21 16:04 by RUBA DOHERTY      Neb-Albuterol/Ipratropium (Ipratropium/Albuterol) 3 Ml Ampul.neb      3 ML INH Q4H PRN for SHORTNESS OF BREATH, #180 NEB 0 Refills         Reported         5/18/21       Budesonide/Formoterol Fumarate (Symbicort 160/4.5 Mcg)  10.2 Gm Inh      2 PUFF INH RTBID, #1 INH 0 Refills         Reported         5/18/21       predniSONE (Deltasone) 5 Mg Tablet      10 MG PO QDAY, TAB         Reported         5/18/21       Atenolol (ATENOLOL) 25 Mg Tablet      25 MG PO QDAY, #30 TAB 0 Refills         Reported         5/18/21       Benzonatate (Benzonatate) 100 Mg Capsule      200 MG PO Q4HR PRN for COUGH, #60 CAP         Reported         5/18/21       Amiodarone Hcl (Amiodarone) 400 Mg Tablet      400 MG PO QDAY         Reported         4/26/21       Insulin Human Aspart (novoLOG FLEXPEN) 100 Unit/1 Ml Insuln.pen      5 UNITS SUBQ AC, #1 BOX 0 Refills         Prov: Juanita Carcamo         3/3/21       Insulin Glargine,Hum.rec.anlog (Basaglar Kwikpen U-100) 100 Unit/1 Ml Insuln.pen      15 UNITS SUBQ QDAY, #1 BOX         Prov: Juanita Carcamo         3/3/21      Medications Reviewed:  Changes made to meds            IMPRESSION/PLAN      Diagnosis      Notes      New Medications      * Benzonatate 100 MG CAPSULE: 200 MG PO Q4HR PRN COUGH #60      * ATENOLOL 25 MG TABLET: 25 MG PO QDAY #30      * predniSONE (Deltasone) 5 MG TABLET: 10 MG PO QDAY      * Budesonide/Formoterol Fumarate (Symbicort 160/4.5 Mcg) 10.2 GM INH: 2 PUFF INH      RTBID #1      * NEB-ALBUTEROL/IPRATROPIUM (Ipratropium/Albuterol) 3 ML AMPUL.NEB: 3 ML INH Q4H      PRN SHORTNESS OF BREATH #180         Instructions: DIAGNOSIS CODE REQUIRED PRIOR TO PRESCRIBING.      Discontinued Medications      * MDI-Albuterol (Ventolin HFA) 8 GM HFA.AER.AD: 1 PUFFS INH Q4-6H PRN SHORTNESS       OF BREATH #1      * Metoprolol Succinate 50 MG TAB.ER.24H: 50 MG PO BID 30 Days #60      * Aspirin EC 81 MG TABLET.DR: 81 MG PO QDAY@08 30 Days #30      * Furosemide 40 MG TABLET: 40 MG PO QDAY 30 Days #30      * Apixaban (Eliquis) 5 MG TABLET: 5 MG PO BID 30 Days #60            Plan      Large cell NET of the lung: Patient was treated with concurrent chemoradiation     with carboplatin and etoposide. Follow-up  PET scan showed improvement in     disease.  She did not undergo PCI.  CT scan on 1/7/2020 showed worsening     mediastinal adenopathy.  Fortunately, bronch and biopsy was negative for     recurrence.  However she was recently hospitalized for worsening shortness of     breath and reports being diagnosed with bacterial pneumonia.  She reports     bronchoscopy at the outside hospital was negative for malignancy.  However,     since discharge she has become more short of breath and is waking up in the     middle the night unable to breathe.  With no confirmed evidence of cancer     recurrence there is no indication for treatment at this time.  I will follow up     with the patient in 3 to 4 weeks by phone.            Hypoxic respiratory failure: Multifactorial, secondary to possible bacterial     pneumonia, COPD, history of lung cancer treatment and organizing pneumonis.      Patient reports being diagnosed with bacterial pneumonia at an outside hospital.     I will attempt to get the records of this visit especially as they pertain to     her bronchoscopy.  Her oxygen saturation was 86% on room air when she arrived in    clinic.  It improved after several minutes of rest.  I recommend home oxygen     with activity and at nighttime given patient's history of severe shortness of     breath at night.  Patient reports she has follow-up in pulmonology on Monday for    her history of organizing pneumonia.  I will reach out to the pulmonology team     to notify them of her current status.            Patient Education      Patient Education Provided:  Yes            Electronically signed by RUBA DOHERTY  05/18/2021 16:04       Disclaimer: Converted document may not contain table formatting or lab diagrams. Please see [employer] for the authenticated document.

## 2021-06-18 NOTE — TELEPHONE ENCOUNTER
Spoke with Daniel and Daniel informed they are going out to pt's home today to switch pt's oxygen tanks out to something of lighter weight. Contacted pt's son back to relay this information. Encouraged son to contact us if for some reason the issue is still not resolved after today. Son expressed understanding and gratitude.  support remains available.

## 2021-06-18 NOTE — TELEPHONE ENCOUNTER
Jefry called and states that the O2 tank that the pt has to carry around with her when she goes out is heavy and hurts her to carry around. Jefry states the pt is asking for a small light weight O2 machine so that the pt could live a more normal life without the pain of carrying a heavy tank.

## 2021-07-06 NOTE — ED PROVIDER NOTES
Time: 12:42 AM EDT  Arrived by: private car  Chief Complaint: Unresponsive  History provided by: ED STAFF  History is limited by: UNRESPONSIVE     History of Present Illness:  Patient is a 61 y.o. year old female that presents to the emergency department for unresponsiveness.  Pt's  reports that he and the pt had been out with friends and had an overall good day. He states that she did not have any complaints today. He reports that when it was time for them to leave, as the pt was getting in the car she fell, he states that he isn't sure if she hit her head when she fell or not, however he was able to get her back into the car with help from bystanders and the pt was responding appropriately. He states while he was driving she became unresponsive and she stopped breathing and he rushed to the ED his drive per spouse was about 10 min. On arrival he ran into the ED to get help. He had difficulty finding his car initially to direct staff. When ED staff got to his car they found patient without a pulse with no spontaneous respirations. CPR was started and she was rushed to the trauma room        Onset of Symptoms: Just prior to arrival  Timing: constant Symptoms unchanged   Context: UNKNOWN  Location: UNKNOWN  Quality: none  Severity: severe  Modifying factors: N/A  Associated signs/symptoms: N/A    Similar Symptoms Previously: NO  Recently seen: UNKNOWN      Patient Care Team  Primary Care Provider: Trent Freeman    Past Medical History:     Not on File  No past medical history on file.  No past surgical history on file.  No family history on file.    Home Medications:  Prior to Admission medications    Not on File        Social History:   Social History     Tobacco Use   • Smoking status: Not on file   Substance Use Topics   • Alcohol use: Not on file   • Drug use: Not on file     Recent travel: not applicable     Record Review:  I have reviewed the patient's records in Epic.     Review of  Systems:  Review of Systems   Unable to perform ROS: Patient unresponsive        Physical Exam:  BP (!) 129/106   Pulse 142   Wt 99.3 kg (218 lb 14.7 oz)   SpO2 (!) 87%     Physical Exam   Appearance: Ongoing CPR.  Unresponsive.  No visible trauma.    Eyes: Pupils fixed and dilated.    Neck: Normal inspection.    CVS: Chest compressions performed.  No spontaneous pulse.    Respiratory: Ventilated.  No spontaneous respirations.    Abdomen: Soft.    Skin: Cyanosis.  Pallor.    Extremities: No lower extremity edema.    Neuro: Unresponsive.  No motor response to pain.              Medications in the Emergency Department:  Medications   EPINEPHrine (ADRENALIN) injection (1 mg Intravenous Given 7/6/21 0059)   sodium bicarbonate injection 8.4% (50 mEq Intravenous Given 7/6/21 0100)   calcium chloride injection (1 g Intravenous Given 7/6/21 0046)   amiodarone (CORDARONE) injection (300 mg Intravenous Given 7/6/21 0050)   dextrose (D50W) 25 g/ 50mL Intravenous Solution (25 mL Intravenous Given 7/6/21 0056)   Naloxone HCl (NARCAN) injection (2 mg Intravenous Given 7/6/21 0056)   atropine injection (1 mg Intravenous Given 7/6/21 0059)   atropine injection (0.5 mg Intravenous Given 7/6/21 0112)   atropine injection (1 mg Intravenous Given 7/6/21 0127)   EPINEPHrine (ADRENALIN) injection (1 mg Intravenous Given 7/6/21 0119)   sodium bicarbonate injection 8.4% (50 mEq Intravenous Given 7/6/21 0116)   sodium chloride 0.9 % bolus (0 mL Intravenous Stopped 7/6/21 0153)   DOPamine 400 mg in 250 mL D5W infusion (0 mcg/kg/min × 99.3 kg Intravenous Stopped 7/6/21 0154)   calcium chloride injection (1 g Intravenous Given 7/6/21 0126)   EPINEPHrine 5 mg in 250 mL NS infusion (0 mcg/kg/min × 99.3 kg Intravenous Stopped 7/6/21 0154)   sodium bicarbonate injection 8.4% (50 mEq Intravenous Given 7/6/21 0131)   EPINEPHrine (ADRENALIN) injection (1 mg  Given 7/6/21 0142)        Labs  Lab Results (last 24 hours)     Procedure Component Value  Units Date/Time    ABG with Co-Ox and Electrolytes [[id number]]  (Abnormal) Collected: 07/06/21 0057    Specimen: Arterial Blood Updated: 07/06/21 0103     pH, Arterial 6.905 pH units      pCO2, Arterial 97.3 mm Hg      pO2, Arterial <40.5 mm Hg      HCO3, Arterial 18.8 mmol/L      Base Excess, Arterial -14.6 mmol/L      O2 Saturation, Arterial --     Comment: Value out of range, too low to read. Patient condition-code blue        Hemoglobin, Blood Gas 10.4 g/dL      Carboxyhemoglobin 1.0 %      Methemoglobin 1.80 %      Oxyhemoglobin 9.0 %      FHHB 88.2 %      Jorge's Test N/A     Note --     Site Arterial: right femoral     Modality Ambu bag     FIO2 100 %      Flow Rate 15 lpm      Sodium, Arterial 139.3 mmol/L      Potassium, Arterial 5.48 mmol/L      Ionized Calcium, Arterial 1.45 mmol/L      Chloride, Arterial 101 mmol/L      Glucose, Arterial 238 mmol/L      Lactate, Arterial 15.03 mmol/L      PO2/FIO2 <41    POC Glucose Once [[id number]]  (Abnormal) Collected: 07/06/21 0109    Specimen: Blood Updated: 07/06/21 0109     Glucose 390 mg/dL      Comment: Serial Number: [id number]:  [id number]       Royal Blue EDTA [[id number]] Collected: 07/06/21 0150    Specimen: Blood Updated: 07/06/21 0255     Extra Tube hold     [facility] Blood Culture Bottle Set [[id number]] Collected: 07/06/21 0152    Specimen: Blood Updated: 07/06/21 0300     Extra Tube Hold for add-ons.     Comment: Auto resulted.              Imaging:  XR Chest 1 View   Final Result          Procedures:  Intubation    Date/Time: 7/6/2021 1:00 AM  Performed by: Idalmis Valadez MD  Authorized by: Idalmis Valadez MD     Consent:     Consent obtained:  Emergent situation    Consent given by: EMERGENT SITUATION.  Pre-procedure details:     Patient status:  Unresponsive    Pretreatment medications:  None    Paralytics:  None  Procedure details:     Preoxygenation:  Bag valve mask    CPR in progress: yes      Intubation method:  Oral    Laryngoscope  blade:  Mac 3    Tube size (mm):  7.5    Tube type:  Cuffed  Placement assessment:     Tube secured with:  ETT sarkar    Placement verification: chest rise, CXR verification and equal breath sounds      CXR findings:  ETT in proper place  Post-procedure details:     Patient tolerance of procedure:  Tolerated well, no immediate complications        Progress  ED Course as of Jul 06 1532   Tue Jul 06, 2021   0143 TOD at 0143    [LS]      ED Course User Index  [LS] Rosalino Carrera                            Medical Decision Making:  MDM  Number of Diagnoses or Management Options  Cardiac arrest (CMS/[facility])  Diagnosis management comments: At time of evaluation CPR is in progress.  Patient is also being bagged.  She was intubated in emergency department.  Patient was given multiple doses of epinephrine, bicarb, calcium chloride.  She also was defibrillated once and given dose of amiodarone.  We were able to get patient back several times but she became bradycardic then loss of pulses and CPR had to be restarted.  We tried to externally pace patient without any success.  I discussed with patient's family the low likelihood of any meaningful recovery.  After multiple attempts and continuously losing pulse time of death was called at 01:43 AM       Amount and/or Complexity of Data Reviewed  Clinical lab tests: ordered and reviewed  Tests in the radiology section of CPT®: reviewed and ordered  Tests in the medicine section of CPT®: reviewed and ordered  Review and summarize past medical records: yes  Independent visualization of images, tracings, or specimens: yes    Risk of Complications, Morbidity, and/or Mortality  Presenting problems: high  Diagnostic procedures: high  Management options: high    Critical Care  Total time providing critical care: 30-74 minutes (Time includes: direct patient care, patient reassessment, coordination of patient care, interpretation of data (laboratory data and imaging), review of patient's  medical records, medical consultation, family consultation regarding treatment decisions and documentation of patient care.)       Final diagnoses:   Cardiac arrest (CMS/[facility])        Disposition:  ED Disposition     ED Disposition Condition Comment                Documentation assistance provided by Rosalino Carrera acting as scribe for Idalmis Valadez MD. Information recorded by the scribe was done at my direction and has been verified and validated by me.          Idalmis Valadez MD  [id number]

## 2021-07-26 ENCOUNTER — APPOINTMENT (OUTPATIENT)
Dept: CT IMAGING | Facility: HOSPITAL | Age: 61
End: 2021-07-26

## (undated) DEVICE — BITEBLOCK OMNI BLOC

## (undated) DEVICE — MSK AIRWY LARYNG LMA PILOT SZ4

## (undated) DEVICE — VITAL SIGNS™ JACKSON-REES CIRCUITS: Brand: VITAL SIGNS™

## (undated) DEVICE — TRAP,MUCUS SPECIMEN, 80CC: Brand: MEDLINE

## (undated) DEVICE — ADAPT SWVL FIBROPTIC BRONCH

## (undated) DEVICE — CONMED DISPOSABLE MICROBIOLOGY BRUSH, Ø1 MM, 1.8 MM WORKING DIAMETER, 110 CM LENGTH: Brand: CONMED

## (undated) DEVICE — LN SMPL O2 NASL/ORL SMART/CAPNOLINE PLS A/

## (undated) DEVICE — Device: Brand: BALLOON

## (undated) DEVICE — TUBING, SUCTION, 1/4" X 10', STRAIGHT: Brand: MEDLINE

## (undated) DEVICE — SINGLE USE SUCTION VALVE MAJ-209: Brand: SINGLE USE SUCTION VALVE (STERILE)

## (undated) DEVICE — LARGE BORE STOPCOCK WITH EXTENSION SET, MALE LUER LOCK ADAPTER WITH RETRACTABLE COLLAR

## (undated) DEVICE — ADAPT CLN BIOGUARD AIR/H2O DISP

## (undated) DEVICE — SINGLE USE BIOPSY VALVE MAJ-210: Brand: SINGLE USE BIOPSY VALVE (STERILE)

## (undated) DEVICE — SENSR O2 OXIMAX FNGR A/ 18IN NONSTR

## (undated) DEVICE — SINGLE USE ASPIRATION NEEDLE: Brand: SINGLE USE ASPIRATION NEEDLE